# Patient Record
Sex: FEMALE | Race: WHITE | NOT HISPANIC OR LATINO
[De-identification: names, ages, dates, MRNs, and addresses within clinical notes are randomized per-mention and may not be internally consistent; named-entity substitution may affect disease eponyms.]

---

## 2017-03-20 ENCOUNTER — APPOINTMENT (OUTPATIENT)
Dept: THORACIC SURGERY | Facility: CLINIC | Age: 82
End: 2017-03-20

## 2017-03-20 VITALS
DIASTOLIC BLOOD PRESSURE: 72 MMHG | HEART RATE: 79 BPM | RESPIRATION RATE: 16 BRPM | HEIGHT: 61 IN | SYSTOLIC BLOOD PRESSURE: 180 MMHG | BODY MASS INDEX: 24.55 KG/M2 | WEIGHT: 130 LBS | OXYGEN SATURATION: 95 %

## 2017-04-07 ENCOUNTER — APPOINTMENT (OUTPATIENT)
Dept: MRI IMAGING | Facility: CLINIC | Age: 82
End: 2017-04-07

## 2017-04-07 ENCOUNTER — OUTPATIENT (OUTPATIENT)
Dept: OUTPATIENT SERVICES | Facility: HOSPITAL | Age: 82
LOS: 1 days | End: 2017-04-07
Payer: COMMERCIAL

## 2017-04-07 DIAGNOSIS — R59.0 LOCALIZED ENLARGED LYMPH NODES: ICD-10-CM

## 2017-04-07 PROCEDURE — A9585: CPT

## 2017-04-07 PROCEDURE — 71552 MRI CHEST W/O & W/DYE: CPT

## 2017-05-08 ENCOUNTER — APPOINTMENT (OUTPATIENT)
Dept: THORACIC SURGERY | Facility: CLINIC | Age: 82
End: 2017-05-08

## 2017-05-08 VITALS
DIASTOLIC BLOOD PRESSURE: 70 MMHG | RESPIRATION RATE: 16 BRPM | WEIGHT: 130 LBS | HEIGHT: 60 IN | BODY MASS INDEX: 25.52 KG/M2 | HEART RATE: 98 BPM | OXYGEN SATURATION: 95 % | SYSTOLIC BLOOD PRESSURE: 148 MMHG

## 2017-11-02 ENCOUNTER — RECORD ABSTRACTING (OUTPATIENT)
Age: 82
End: 2017-11-02

## 2017-12-18 ENCOUNTER — APPOINTMENT (OUTPATIENT)
Dept: THORACIC SURGERY | Facility: CLINIC | Age: 82
End: 2017-12-18
Payer: MEDICARE

## 2017-12-18 VITALS
DIASTOLIC BLOOD PRESSURE: 89 MMHG | SYSTOLIC BLOOD PRESSURE: 167 MMHG | WEIGHT: 111 LBS | RESPIRATION RATE: 16 BRPM | HEART RATE: 96 BPM | OXYGEN SATURATION: 93 % | BODY MASS INDEX: 21.79 KG/M2 | HEIGHT: 60 IN

## 2017-12-18 PROCEDURE — 99215 OFFICE O/P EST HI 40 MIN: CPT

## 2018-01-03 ENCOUNTER — APPOINTMENT (OUTPATIENT)
Dept: PULMONOLOGY | Facility: CLINIC | Age: 83
End: 2018-01-03
Payer: COMMERCIAL

## 2018-01-03 VITALS
BODY MASS INDEX: 23.51 KG/M2 | HEIGHT: 58 IN | DIASTOLIC BLOOD PRESSURE: 80 MMHG | HEART RATE: 79 BPM | SYSTOLIC BLOOD PRESSURE: 138 MMHG | WEIGHT: 112 LBS | OXYGEN SATURATION: 95 %

## 2018-01-03 DIAGNOSIS — Z87.891 PERSONAL HISTORY OF NICOTINE DEPENDENCE: ICD-10-CM

## 2018-01-03 PROCEDURE — 94010 BREATHING CAPACITY TEST: CPT

## 2018-01-03 PROCEDURE — 99215 OFFICE O/P EST HI 40 MIN: CPT | Mod: 25

## 2018-02-05 ENCOUNTER — APPOINTMENT (OUTPATIENT)
Dept: THORACIC SURGERY | Facility: CLINIC | Age: 83
End: 2018-02-05
Payer: COMMERCIAL

## 2018-02-05 VITALS
RESPIRATION RATE: 16 BRPM | SYSTOLIC BLOOD PRESSURE: 123 MMHG | DIASTOLIC BLOOD PRESSURE: 73 MMHG | HEIGHT: 58 IN | OXYGEN SATURATION: 94 % | HEART RATE: 85 BPM

## 2018-02-05 PROCEDURE — 99214 OFFICE O/P EST MOD 30 MIN: CPT

## 2018-04-09 ENCOUNTER — APPOINTMENT (OUTPATIENT)
Dept: PULMONOLOGY | Facility: CLINIC | Age: 83
End: 2018-04-09
Payer: MEDICARE

## 2018-04-09 VITALS
BODY MASS INDEX: 23.2 KG/M2 | SYSTOLIC BLOOD PRESSURE: 114 MMHG | DIASTOLIC BLOOD PRESSURE: 78 MMHG | HEART RATE: 92 BPM | OXYGEN SATURATION: 94 % | WEIGHT: 111 LBS | RESPIRATION RATE: 16 BRPM

## 2018-04-09 DIAGNOSIS — R06.09 OTHER FORMS OF DYSPNEA: ICD-10-CM

## 2018-04-09 DIAGNOSIS — J44.9 CHRONIC OBSTRUCTIVE PULMONARY DISEASE, UNSPECIFIED: ICD-10-CM

## 2018-04-09 PROCEDURE — 99214 OFFICE O/P EST MOD 30 MIN: CPT

## 2018-04-09 RX ORDER — NIFEDIPINE 30 MG/1
30 TABLET, EXTENDED RELEASE ORAL
Qty: 90 | Refills: 0 | Status: ACTIVE | COMMUNITY
Start: 2017-06-28

## 2018-04-09 RX ORDER — SIMVASTATIN 40 MG/1
40 TABLET, FILM COATED ORAL
Qty: 90 | Refills: 0 | Status: ACTIVE | COMMUNITY
Start: 2017-12-08

## 2018-06-24 ENCOUNTER — EMERGENCY (EMERGENCY)
Facility: HOSPITAL | Age: 83
LOS: 1 days | Discharge: HOME | End: 2018-06-24
Attending: EMERGENCY MEDICINE
Payer: COMMERCIAL

## 2018-06-24 VITALS
OXYGEN SATURATION: 95 % | DIASTOLIC BLOOD PRESSURE: 72 MMHG | RESPIRATION RATE: 18 BRPM | HEART RATE: 78 BPM | SYSTOLIC BLOOD PRESSURE: 142 MMHG

## 2018-06-24 VITALS
HEART RATE: 87 BPM | SYSTOLIC BLOOD PRESSURE: 137 MMHG | TEMPERATURE: 98 F | RESPIRATION RATE: 16 BRPM | HEIGHT: 57 IN | DIASTOLIC BLOOD PRESSURE: 77 MMHG | OXYGEN SATURATION: 99 % | WEIGHT: 111.99 LBS

## 2018-06-24 LAB
ALBUMIN SERPL ELPH-MCNC: 3.8 G/DL — SIGNIFICANT CHANGE UP (ref 3.3–5.2)
ALP SERPL-CCNC: 110 U/L — SIGNIFICANT CHANGE UP (ref 40–120)
ALT FLD-CCNC: 14 U/L — SIGNIFICANT CHANGE UP
ANION GAP SERPL CALC-SCNC: 17 MMOL/L — SIGNIFICANT CHANGE UP (ref 5–17)
APPEARANCE UR: CLEAR — SIGNIFICANT CHANGE UP
APTT BLD: 29.3 SEC — SIGNIFICANT CHANGE UP (ref 27.5–37.4)
AST SERPL-CCNC: 18 U/L — SIGNIFICANT CHANGE UP
BASOPHILS # BLD AUTO: 0 K/UL — SIGNIFICANT CHANGE UP (ref 0–0.2)
BASOPHILS NFR BLD AUTO: 0.2 % — SIGNIFICANT CHANGE UP (ref 0–2)
BILIRUB SERPL-MCNC: 0.2 MG/DL — LOW (ref 0.4–2)
BILIRUB UR-MCNC: NEGATIVE — SIGNIFICANT CHANGE UP
BUN SERPL-MCNC: 12 MG/DL — SIGNIFICANT CHANGE UP (ref 8–20)
CALCIUM SERPL-MCNC: 8.9 MG/DL — SIGNIFICANT CHANGE UP (ref 8.6–10.2)
CHLORIDE SERPL-SCNC: 98 MMOL/L — SIGNIFICANT CHANGE UP (ref 98–107)
CO2 SERPL-SCNC: 23 MMOL/L — SIGNIFICANT CHANGE UP (ref 22–29)
COLOR SPEC: YELLOW — SIGNIFICANT CHANGE UP
CREAT SERPL-MCNC: 0.44 MG/DL — LOW (ref 0.5–1.3)
DIFF PNL FLD: ABNORMAL
EOSINOPHIL # BLD AUTO: 1.1 K/UL — HIGH (ref 0–0.5)
EOSINOPHIL NFR BLD AUTO: 7.4 % — HIGH (ref 0–6)
EPI CELLS # UR: SIGNIFICANT CHANGE UP
GLUCOSE SERPL-MCNC: 87 MG/DL — SIGNIFICANT CHANGE UP (ref 70–115)
GLUCOSE UR QL: NEGATIVE MG/DL — SIGNIFICANT CHANGE UP
HCT VFR BLD CALC: 39.1 % — SIGNIFICANT CHANGE UP (ref 37–47)
HGB BLD-MCNC: 12.4 G/DL — SIGNIFICANT CHANGE UP (ref 12–16)
INR BLD: 1.12 RATIO — SIGNIFICANT CHANGE UP (ref 0.88–1.16)
KETONES UR-MCNC: ABNORMAL
LEUKOCYTE ESTERASE UR-ACNC: ABNORMAL
LYMPHOCYTES # BLD AUTO: 21.3 % — SIGNIFICANT CHANGE UP (ref 20–55)
LYMPHOCYTES # BLD AUTO: 3.2 K/UL — SIGNIFICANT CHANGE UP (ref 1–4.8)
MCHC RBC-ENTMCNC: 26.1 PG — LOW (ref 27–31)
MCHC RBC-ENTMCNC: 31.7 G/DL — LOW (ref 32–36)
MCV RBC AUTO: 82.3 FL — SIGNIFICANT CHANGE UP (ref 81–99)
MONOCYTES # BLD AUTO: 1.4 K/UL — HIGH (ref 0–0.8)
MONOCYTES NFR BLD AUTO: 9.4 % — SIGNIFICANT CHANGE UP (ref 3–10)
NEUTROPHILS # BLD AUTO: 9.2 K/UL — HIGH (ref 1.8–8)
NEUTROPHILS NFR BLD AUTO: 61.3 % — SIGNIFICANT CHANGE UP (ref 37–73)
NITRITE UR-MCNC: NEGATIVE — SIGNIFICANT CHANGE UP
PH UR: 6 — SIGNIFICANT CHANGE UP (ref 5–8)
PLATELET # BLD AUTO: 416 K/UL — HIGH (ref 150–400)
POTASSIUM SERPL-MCNC: 3.4 MMOL/L — LOW (ref 3.5–5.3)
POTASSIUM SERPL-SCNC: 3.4 MMOL/L — LOW (ref 3.5–5.3)
PROT SERPL-MCNC: 7.4 G/DL — SIGNIFICANT CHANGE UP (ref 6.6–8.7)
PROT UR-MCNC: 30 MG/DL
PROTHROM AB SERPL-ACNC: 12.3 SEC — SIGNIFICANT CHANGE UP (ref 9.8–12.7)
RBC # BLD: 4.75 M/UL — SIGNIFICANT CHANGE UP (ref 4.4–5.2)
RBC # FLD: 14.3 % — SIGNIFICANT CHANGE UP (ref 11–15.6)
SODIUM SERPL-SCNC: 138 MMOL/L — SIGNIFICANT CHANGE UP (ref 135–145)
SP GR SPEC: 1 — LOW (ref 1.01–1.02)
TROPONIN T SERPL-MCNC: <0.01 NG/ML — SIGNIFICANT CHANGE UP (ref 0–0.06)
UROBILINOGEN FLD QL: NEGATIVE MG/DL — SIGNIFICANT CHANGE UP
WBC # BLD: 15 K/UL — HIGH (ref 4.8–10.8)
WBC # FLD AUTO: 15 K/UL — HIGH (ref 4.8–10.8)
WBC UR QL: SIGNIFICANT CHANGE UP

## 2018-06-24 PROCEDURE — 85027 COMPLETE CBC AUTOMATED: CPT

## 2018-06-24 PROCEDURE — 87086 URINE CULTURE/COLONY COUNT: CPT

## 2018-06-24 PROCEDURE — 71275 CT ANGIOGRAPHY CHEST: CPT

## 2018-06-24 PROCEDURE — 85730 THROMBOPLASTIN TIME PARTIAL: CPT

## 2018-06-24 PROCEDURE — 71045 X-RAY EXAM CHEST 1 VIEW: CPT | Mod: 26

## 2018-06-24 PROCEDURE — 85610 PROTHROMBIN TIME: CPT

## 2018-06-24 PROCEDURE — 36415 COLL VENOUS BLD VENIPUNCTURE: CPT

## 2018-06-24 PROCEDURE — 71045 X-RAY EXAM CHEST 1 VIEW: CPT

## 2018-06-24 PROCEDURE — 94640 AIRWAY INHALATION TREATMENT: CPT

## 2018-06-24 PROCEDURE — 99284 EMERGENCY DEPT VISIT MOD MDM: CPT | Mod: 25

## 2018-06-24 PROCEDURE — 71275 CT ANGIOGRAPHY CHEST: CPT | Mod: 26

## 2018-06-24 PROCEDURE — 81001 URINALYSIS AUTO W/SCOPE: CPT

## 2018-06-24 PROCEDURE — 96374 THER/PROPH/DIAG INJ IV PUSH: CPT | Mod: XU

## 2018-06-24 PROCEDURE — 80053 COMPREHEN METABOLIC PANEL: CPT

## 2018-06-24 PROCEDURE — 84484 ASSAY OF TROPONIN QUANT: CPT

## 2018-06-24 PROCEDURE — 99285 EMERGENCY DEPT VISIT HI MDM: CPT

## 2018-06-24 RX ORDER — ACETAMINOPHEN 500 MG
650 TABLET ORAL ONCE
Qty: 0 | Refills: 0 | Status: COMPLETED | OUTPATIENT
Start: 2018-06-24 | End: 2018-06-24

## 2018-06-24 RX ORDER — IPRATROPIUM/ALBUTEROL SULFATE 18-103MCG
3 AEROSOL WITH ADAPTER (GRAM) INHALATION ONCE
Qty: 0 | Refills: 0 | Status: COMPLETED | OUTPATIENT
Start: 2018-06-24 | End: 2018-06-24

## 2018-06-24 RX ORDER — SODIUM CHLORIDE 9 MG/ML
3 INJECTION INTRAMUSCULAR; INTRAVENOUS; SUBCUTANEOUS ONCE
Qty: 0 | Refills: 0 | Status: COMPLETED | OUTPATIENT
Start: 2018-06-24 | End: 2018-06-24

## 2018-06-24 RX ADMIN — Medication 650 MILLIGRAM(S): at 16:02

## 2018-06-24 RX ADMIN — Medication 125 MILLIGRAM(S): at 15:50

## 2018-06-24 RX ADMIN — Medication 3 MILLILITER(S): at 15:50

## 2018-06-24 RX ADMIN — SODIUM CHLORIDE 3 MILLILITER(S): 9 INJECTION INTRAMUSCULAR; INTRAVENOUS; SUBCUTANEOUS at 15:48

## 2018-06-24 RX ADMIN — Medication 650 MILLIGRAM(S): at 16:56

## 2018-06-24 NOTE — ED PROVIDER NOTE - OBJECTIVE STATEMENT
84 y/o F Pt with a hx of lung CA, COPD, HTN, resection of rt lung presents with several weeks of left side thoracic pain progressively worsening. Pt has bruising to the area but denies any trauma. She experiences SOB generally on ambulating. Denies fevers, chills. No further complaints at this time.

## 2018-06-24 NOTE — ED PROVIDER NOTE - MEDICAL DECISION MAKING DETAILS
Pt with a hx of lung CA, and hx of dx. Will perform CTA of chest, eval for lung pathology, pe, treat     for possible reactive airway disease, and re-eval.

## 2018-06-24 NOTE — ED ADULT NURSE NOTE - OBJECTIVE STATEMENT
recd pt  A/Ox3, pt c/o  bilateral flank pain, lower back pain, and left sided rib pain, hurts to touch, pt denies injury or falls, pt denies chest pain or sob. Respirations are even and unlabored, lungs cta, +bowel x4 quads, abdomen soft, nontender/nondistended, no guarding, rebound or rigidity noted, skin w/d/i.

## 2018-06-24 NOTE — ED PROVIDER NOTE - PROGRESS NOTE DETAILS
ct surgery np called - Martha - discussed pts c/o and CT reports given that patient reports she was suppose to be seen by by dr friedman but hasn't for unclear reason - elderly female relatively poor historian asked that np help to ensure f/u appt in the office for pt so son can be made aware, martha says she will come down and see patient.  pt resting comfortably lungs clear no distress

## 2018-06-24 NOTE — ED PROVIDER NOTE - CONSTITUTIONAL, MLM
normal... Well appearing, awake, alert, oriented to person, place, time/situation and in no apparent distress, frail

## 2018-06-25 LAB
CULTURE RESULTS: NO GROWTH — SIGNIFICANT CHANGE UP
SPECIMEN SOURCE: SIGNIFICANT CHANGE UP

## 2018-07-02 ENCOUNTER — APPOINTMENT (OUTPATIENT)
Dept: THORACIC SURGERY | Facility: CLINIC | Age: 83
End: 2018-07-02
Payer: MEDICARE

## 2018-07-02 VITALS
WEIGHT: 108 LBS | SYSTOLIC BLOOD PRESSURE: 116 MMHG | HEIGHT: 58 IN | BODY MASS INDEX: 22.67 KG/M2 | OXYGEN SATURATION: 93 % | DIASTOLIC BLOOD PRESSURE: 68 MMHG | HEART RATE: 93 BPM | RESPIRATION RATE: 16 BRPM

## 2018-07-02 PROCEDURE — 99214 OFFICE O/P EST MOD 30 MIN: CPT

## 2018-07-06 ENCOUNTER — OUTPATIENT (OUTPATIENT)
Dept: OUTPATIENT SERVICES | Facility: HOSPITAL | Age: 83
LOS: 1 days | Discharge: HOME | End: 2018-07-06
Payer: MEDICARE

## 2018-07-06 ENCOUNTER — APPOINTMENT (OUTPATIENT)
Dept: RADIATION ONCOLOGY | Facility: CLINIC | Age: 83
End: 2018-07-06
Payer: MEDICARE

## 2018-07-06 VITALS
DIASTOLIC BLOOD PRESSURE: 81 MMHG | OXYGEN SATURATION: 93 % | SYSTOLIC BLOOD PRESSURE: 145 MMHG | HEART RATE: 98 BPM | RESPIRATION RATE: 18 BRPM

## 2018-07-06 DIAGNOSIS — C34.31 MALIGNANT NEOPLASM OF LOWER LOBE, RIGHT BRONCHUS OR LUNG: ICD-10-CM

## 2018-07-06 DIAGNOSIS — Z80.1 FAMILY HISTORY OF MALIGNANT NEOPLASM OF TRACHEA, BRONCHUS AND LUNG: ICD-10-CM

## 2018-07-06 DIAGNOSIS — R91.1 SOLITARY PULMONARY NODULE: ICD-10-CM

## 2018-07-06 PROCEDURE — 99205 OFFICE O/P NEW HI 60 MIN: CPT | Mod: 25

## 2018-07-06 PROCEDURE — 77263 THER RADIOLOGY TX PLNG CPLX: CPT

## 2018-07-11 ENCOUNTER — OUTPATIENT (OUTPATIENT)
Dept: OUTPATIENT SERVICES | Facility: HOSPITAL | Age: 83
LOS: 1 days | End: 2018-07-11
Payer: COMMERCIAL

## 2018-07-11 VITALS
HEIGHT: 57 IN | RESPIRATION RATE: 16 BRPM | SYSTOLIC BLOOD PRESSURE: 144 MMHG | WEIGHT: 106.92 LBS | TEMPERATURE: 97 F | DIASTOLIC BLOOD PRESSURE: 81 MMHG | HEART RATE: 105 BPM

## 2018-07-11 DIAGNOSIS — I10 ESSENTIAL (PRIMARY) HYPERTENSION: ICD-10-CM

## 2018-07-11 DIAGNOSIS — Z90.2 ACQUIRED ABSENCE OF LUNG [PART OF]: Chronic | ICD-10-CM

## 2018-07-11 DIAGNOSIS — K60.3 ANAL FISTULA: Chronic | ICD-10-CM

## 2018-07-11 DIAGNOSIS — R22.2 LOCALIZED SWELLING, MASS AND LUMP, TRUNK: ICD-10-CM

## 2018-07-11 DIAGNOSIS — Z01.818 ENCOUNTER FOR OTHER PREPROCEDURAL EXAMINATION: ICD-10-CM

## 2018-07-11 DIAGNOSIS — Z98.49 CATARACT EXTRACTION STATUS, UNSPECIFIED EYE: Chronic | ICD-10-CM

## 2018-07-11 DIAGNOSIS — Z29.9 ENCOUNTER FOR PROPHYLACTIC MEASURES, UNSPECIFIED: ICD-10-CM

## 2018-07-11 LAB
APTT BLD: 32.7 SEC — SIGNIFICANT CHANGE UP (ref 27.5–37.4)
HCT VFR BLD CALC: 37.3 % — SIGNIFICANT CHANGE UP (ref 37–47)
HGB BLD-MCNC: 11.5 G/DL — LOW (ref 12–16)
INR BLD: 1.06 RATIO — SIGNIFICANT CHANGE UP (ref 0.88–1.16)
MCHC RBC-ENTMCNC: 25.4 PG — LOW (ref 27–31)
MCHC RBC-ENTMCNC: 30.8 G/DL — LOW (ref 32–36)
MCV RBC AUTO: 82.5 FL — SIGNIFICANT CHANGE UP (ref 81–99)
PLATELET # BLD AUTO: 405 K/UL — HIGH (ref 150–400)
PROTHROM AB SERPL-ACNC: 11.7 SEC — SIGNIFICANT CHANGE UP (ref 9.8–12.7)
RBC # BLD: 4.52 M/UL — SIGNIFICANT CHANGE UP (ref 4.4–5.2)
RBC # FLD: 14.9 % — SIGNIFICANT CHANGE UP (ref 11–15.6)
WBC # BLD: 13.5 K/UL — HIGH (ref 4.8–10.8)
WBC # FLD AUTO: 13.5 K/UL — HIGH (ref 4.8–10.8)

## 2018-07-11 PROCEDURE — 85027 COMPLETE CBC AUTOMATED: CPT

## 2018-07-11 PROCEDURE — 85730 THROMBOPLASTIN TIME PARTIAL: CPT

## 2018-07-11 PROCEDURE — 85610 PROTHROMBIN TIME: CPT

## 2018-07-11 PROCEDURE — 36415 COLL VENOUS BLD VENIPUNCTURE: CPT

## 2018-07-11 PROCEDURE — G0463: CPT

## 2018-07-11 RX ORDER — ALPRAZOLAM 0.25 MG
0 TABLET ORAL
Qty: 0 | Refills: 0 | COMMUNITY

## 2018-07-11 RX ORDER — SIMVASTATIN 20 MG/1
0 TABLET, FILM COATED ORAL
Qty: 0 | Refills: 0 | COMMUNITY

## 2018-07-11 NOTE — H&P PST ADULT - MALLAMPATI CLASS
Class IV (difficult) - the soft palate is not visible at all Class IV (difficult) - the soft palate is not visible at all/Attended by Dr. Vela Mallampati III

## 2018-07-11 NOTE — H&P PST ADULT - NSANTHOSAYNRD_GEN_A_CORE
No. VIRGIL screening performed.  STOP BANG Legend: 0-2 = LOW Risk; 3-4 = INTERMEDIATE Risk; 5-8 = HIGH Risk

## 2018-07-11 NOTE — H&P PST ADULT - NSANTHBPHIGHRD_ENT_A_CORE
From: Torin Fallon  To: Dot Moyer MD  Sent: 10/5/2017 3:36 PM CDT  Subject: perscription    [employer] will be sending a re-fill authorization for my metformin. Will be out in a few days. Also need to set a date for my next appointment, at which time I can also get my flu shot?     Hope you are having a good day,  Reanna
Noted. MEM.
Per spoke with pt. she has appointment on 11/28/17 11:40 am nfst lab.
Yes

## 2018-07-11 NOTE — H&P PST ADULT - PMH
Cancer  lung  Cataract    COPD (chronic obstructive pulmonary disease)    Hypertension    Risk factors for obstructive sleep apnea

## 2018-07-11 NOTE — H&P PST ADULT - HISTORY OF PRESENT ILLNESS
This is an 85 y.o female who presents to PST today.  The pt reports she has a history of lung cancer (right side) and has a yearly cat scan performed.  The most recent CT demonstrated an abnormal finding on the left lung.  She has noticed she is more short of breath and has been experiencing back pain to her left side

## 2018-07-11 NOTE — H&P PST ADULT - ASSESSMENT
CAPRINI SCORE [CLOT]    AGE RELATED RISK FACTORS                                                       MOBILITY RELATED FACTORS  [ ] Age 41-60 years                                            (1 Point)                  [ ] Bed rest                                                        (1 Point)  [] Age: 61-74 years                                           (2 Points)                 [ ] Plaster cast                                                   (2 Points)  [x ] Age= 75 years                                              (3 Points)                 [ ] Bed bound for more than 72 hours                 (2 Points)    DISEASE RELATED RISK FACTORS                                               GENDER SPECIFIC FACTORS  [ ] Edema in the lower extremities                       (1 Point)                  [ ] Pregnancy                                                     (1 Point)  [ ] Varicose veins                                               (1 Point)                  [ ] Post-partum < 6 weeks                                   (1 Point)             [ ] BMI > 25 Kg/m2                                            (1 Point)                  [ ] Hormonal therapy  or oral contraception          (1 Point)                 [ ] Sepsis (in the previous month)                        (1 Point)                  [ ] History of pregnancy complications                 (1 point)  [ ] Pneumonia or serious lung disease                                               [ ] Unexplained or recurrent                     (1 Point)           (in the previous month)                               (1 Point)  [ ] Abnormal pulmonary function test                     (1 Point)                 SURGERY RELATED RISK FACTORS  [ ] Acute myocardial infarction                              (1 Point)                 [ ]  Section                                             (1 Point)  [ ] Congestive heart failure (in the previous month)  (1 Point)               [x ] Minor surgery                                                  (1 Point)   [ ] Inflammatory bowel disease                             (1 Point)                 [ ] Arthroscopic surgery                                        (2 Points)  [ ] Central venous access                                      (2 Points)                [ ] General surgery lasting more than 45 minutes   (2 Points)       [ ] Stroke (in the previous month)                          (5 Points)               [ ] Elective arthroplasty                                         (5 Points)                                                                                                                                               HEMATOLOGY RELATED FACTORS                                                 TRAUMA RELATED RISK FACTORS  [ ] Prior episodes of VTE                                     (3 Points)                 [ ] Fracture of the hip, pelvis, or leg                       (5 Points)  [ ] Positive family history for VTE                         (3 Points)                 [ ] Acute spinal cord injury (in the previous month)  (5 Points)  [ ] Prothrombin 21898 A                                     (3 Points)                 [ ] Paralysis  (less than 1 month)                             (5 Points)  [ ] Factor V Leiden                                             (3 Points)                  [ ] Multiple Trauma within 1 month                        (5 Points)  [ ] Lupus anticoagulants                                     (3 Points)                                                           [ ] Anticardiolipin antibodies                               (3 Points)                                                       [ ] High homocysteine in the blood                      (3 Points)                                             [ ] Other congenital or acquired thrombophilia      (3 Points)                                                [ ] Heparin induced thrombocytopenia                  (3 Points)                                          Total Score [    4      ]

## 2018-07-16 ENCOUNTER — OUTPATIENT (OUTPATIENT)
Dept: OUTPATIENT SERVICES | Facility: HOSPITAL | Age: 83
LOS: 1 days | End: 2018-07-16
Payer: COMMERCIAL

## 2018-07-16 ENCOUNTER — RESULT REVIEW (OUTPATIENT)
Age: 83
End: 2018-07-16

## 2018-07-16 VITALS
DIASTOLIC BLOOD PRESSURE: 83 MMHG | OXYGEN SATURATION: 97 % | TEMPERATURE: 99 F | HEART RATE: 95 BPM | RESPIRATION RATE: 18 BRPM | HEIGHT: 57 IN | WEIGHT: 106.92 LBS | SYSTOLIC BLOOD PRESSURE: 116 MMHG

## 2018-07-16 VITALS — SYSTOLIC BLOOD PRESSURE: 135 MMHG | DIASTOLIC BLOOD PRESSURE: 81 MMHG

## 2018-07-16 DIAGNOSIS — R22.2 LOCALIZED SWELLING, MASS AND LUMP, TRUNK: ICD-10-CM

## 2018-07-16 DIAGNOSIS — Z90.2 ACQUIRED ABSENCE OF LUNG [PART OF]: Chronic | ICD-10-CM

## 2018-07-16 DIAGNOSIS — Z98.49 CATARACT EXTRACTION STATUS, UNSPECIFIED EYE: Chronic | ICD-10-CM

## 2018-07-16 DIAGNOSIS — K60.3 ANAL FISTULA: Chronic | ICD-10-CM

## 2018-07-16 PROCEDURE — 88341 IMHCHEM/IMCYTCHM EA ADD ANTB: CPT

## 2018-07-16 PROCEDURE — 88342 IMHCHEM/IMCYTCHM 1ST ANTB: CPT

## 2018-07-16 PROCEDURE — 32405: CPT | Mod: LT

## 2018-07-16 PROCEDURE — 88305 TISSUE EXAM BY PATHOLOGIST: CPT

## 2018-07-16 PROCEDURE — 88342 IMHCHEM/IMCYTCHM 1ST ANTB: CPT | Mod: 26

## 2018-07-16 PROCEDURE — 77012 CT SCAN FOR NEEDLE BIOPSY: CPT

## 2018-07-16 PROCEDURE — 71045 X-RAY EXAM CHEST 1 VIEW: CPT

## 2018-07-16 PROCEDURE — 77012 CT SCAN FOR NEEDLE BIOPSY: CPT | Mod: 26

## 2018-07-16 PROCEDURE — 88305 TISSUE EXAM BY PATHOLOGIST: CPT | Mod: 26

## 2018-07-16 PROCEDURE — 71045 X-RAY EXAM CHEST 1 VIEW: CPT | Mod: 26

## 2018-07-16 PROCEDURE — 88341 IMHCHEM/IMCYTCHM EA ADD ANTB: CPT | Mod: 26

## 2018-07-16 RX ORDER — HYDROMORPHONE HYDROCHLORIDE 2 MG/ML
0.5 INJECTION INTRAMUSCULAR; INTRAVENOUS; SUBCUTANEOUS ONCE
Qty: 0 | Refills: 0 | Status: DISCONTINUED | OUTPATIENT
Start: 2018-07-16 | End: 2018-07-16

## 2018-07-16 RX ORDER — ACETAMINOPHEN 500 MG
1000 TABLET ORAL ONCE
Qty: 0 | Refills: 0 | Status: DISCONTINUED | OUTPATIENT
Start: 2018-07-16 | End: 2018-07-16

## 2018-07-16 RX ADMIN — HYDROMORPHONE HYDROCHLORIDE 0.5 MILLIGRAM(S): 2 INJECTION INTRAMUSCULAR; INTRAVENOUS; SUBCUTANEOUS at 13:54

## 2018-07-16 NOTE — ASU DISCHARGE PLAN (ADULT/PEDIATRIC). - MEDICATION SUMMARY - MEDICATIONS TO TAKE
I will START or STAY ON the medications listed below when I get home from the hospital:    aspirin 81 mg oral tablet  -- 1 tab(s) by mouth once a day  -- Indication: For per PMD    PROzac 20 mg oral capsule  -- orally 2 times a day  -- Indication: For per PMD    Antivert 12.5 mg oral tablet  -- 1 tab(s) by mouth 3 times a day, As Needed  -- Indication: For per PMD    Zocor 40 mg oral tablet  -- 1 tab(s) by mouth once a day (at bedtime)  -- Indication: For per PMD    Xanax 0.25 mg oral tablet  -- 1 tab(s) by mouth 3 times a day  -- Indication: For per PMD    albuterol  -- Indication: For per PMD    Procardia  -- 30 milligram(s) by mouth once a day  -- Indication: For per PMD    PriLOSEC 20 mg oral delayed release capsule  -- 1 cap(s) by mouth once a day  -- Indication: For per PMD

## 2018-07-16 NOTE — ASU DISCHARGE PLAN (ADULT/PEDIATRIC). - ASU FOLLOWUP
911 or go to the nearest Emergency Room
Melyssa Escamilla MD  Pediatric Hospitalist  office: [phone]  pager: [phone]

## 2018-07-16 NOTE — ASU DISCHARGE PLAN (ADULT/PEDIATRIC). - NOTIFY
Fever greater than 101/Pain not relieved by Medications/chest pain or shortness of breath go to nearest emergency dept/Bleeding that does not stop

## 2018-07-23 PROBLEM — H26.9 UNSPECIFIED CATARACT: Chronic | Status: ACTIVE | Noted: 2018-07-11

## 2018-07-23 PROBLEM — Z91.89 OTHER SPECIFIED PERSONAL RISK FACTORS, NOT ELSEWHERE CLASSIFIED: Chronic | Status: ACTIVE | Noted: 2018-07-11

## 2018-07-26 PROCEDURE — 77290 THER RAD SIMULAJ FIELD CPLX: CPT | Mod: 26

## 2018-07-26 PROCEDURE — 77334 RADIATION TREATMENT AID(S): CPT | Mod: 26

## 2018-07-30 ENCOUNTER — APPOINTMENT (OUTPATIENT)
Dept: THORACIC SURGERY | Facility: CLINIC | Age: 83
End: 2018-07-30
Payer: MEDICARE

## 2018-07-30 VITALS
HEART RATE: 100 BPM | OXYGEN SATURATION: 92 % | RESPIRATION RATE: 16 BRPM | WEIGHT: 106 LBS | BODY MASS INDEX: 22.25 KG/M2 | DIASTOLIC BLOOD PRESSURE: 73 MMHG | SYSTOLIC BLOOD PRESSURE: 119 MMHG | HEIGHT: 58 IN

## 2018-07-30 PROCEDURE — 99214 OFFICE O/P EST MOD 30 MIN: CPT

## 2018-08-01 PROCEDURE — 77334 RADIATION TREATMENT AID(S): CPT | Mod: 26

## 2018-08-01 PROCEDURE — 77293 RESPIRATOR MOTION MGMT SIMUL: CPT | Mod: 26

## 2018-08-01 PROCEDURE — 77295 3-D RADIOTHERAPY PLAN: CPT | Mod: 26

## 2018-08-01 PROCEDURE — 77300 RADIATION THERAPY DOSE PLAN: CPT | Mod: 26

## 2018-08-02 ENCOUNTER — RX RENEWAL (OUTPATIENT)
Age: 83
End: 2018-08-02

## 2018-08-06 ENCOUNTER — APPOINTMENT (OUTPATIENT)
Dept: PULMONOLOGY | Facility: CLINIC | Age: 83
End: 2018-08-06

## 2018-08-06 ENCOUNTER — OUTPATIENT (OUTPATIENT)
Dept: OUTPATIENT SERVICES | Facility: HOSPITAL | Age: 83
LOS: 1 days | Discharge: HOME | End: 2018-08-06

## 2018-08-06 DIAGNOSIS — Z98.49 CATARACT EXTRACTION STATUS, UNSPECIFIED EYE: Chronic | ICD-10-CM

## 2018-08-06 DIAGNOSIS — K60.3 ANAL FISTULA: Chronic | ICD-10-CM

## 2018-08-06 DIAGNOSIS — Z90.2 ACQUIRED ABSENCE OF LUNG [PART OF]: Chronic | ICD-10-CM

## 2018-08-06 DIAGNOSIS — C34.90 MALIGNANT NEOPLASM OF UNSPECIFIED PART OF UNSPECIFIED BRONCHUS OR LUNG: ICD-10-CM

## 2018-08-07 PROCEDURE — 77280 THER RAD SIMULAJ FIELD SMPL: CPT | Mod: 26

## 2018-08-09 PROCEDURE — 77280 THER RAD SIMULAJ FIELD SMPL: CPT | Mod: 26

## 2018-08-10 RX ORDER — OXYCODONE AND ACETAMINOPHEN 7.5; 325 MG/1; MG/1
7.5-325 TABLET ORAL
Qty: 90 | Refills: 0 | Status: ACTIVE | COMMUNITY
Start: 2018-08-10 | End: 1900-01-01

## 2018-08-14 ENCOUNTER — APPOINTMENT (OUTPATIENT)
Dept: PHYSICAL MEDICINE AND REHAB | Facility: CLINIC | Age: 83
End: 2018-08-14

## 2018-08-15 PROCEDURE — 77427 RADIATION TX MANAGEMENT X5: CPT

## 2018-08-16 VITALS
HEIGHT: 58 IN | BODY MASS INDEX: 20.57 KG/M2 | HEART RATE: 116 BPM | WEIGHT: 98 LBS | OXYGEN SATURATION: 86 % | DIASTOLIC BLOOD PRESSURE: 65 MMHG | SYSTOLIC BLOOD PRESSURE: 117 MMHG | RESPIRATION RATE: 16 BRPM

## 2018-08-16 RX ORDER — DEXAMETHASONE 2 MG/1
2 TABLET ORAL DAILY
Qty: 30 | Refills: 1 | Status: ACTIVE | COMMUNITY
Start: 2018-08-16 | End: 1900-01-01

## 2018-08-17 ENCOUNTER — APPOINTMENT (OUTPATIENT)
Dept: HEMATOLOGY ONCOLOGY | Facility: CLINIC | Age: 83
End: 2018-08-17
Payer: MEDICARE

## 2018-08-17 VITALS
WEIGHT: 98 LBS | OXYGEN SATURATION: 96 % | DIASTOLIC BLOOD PRESSURE: 63 MMHG | HEART RATE: 92 BPM | BODY MASS INDEX: 20.57 KG/M2 | SYSTOLIC BLOOD PRESSURE: 101 MMHG | HEIGHT: 58 IN

## 2018-08-17 DIAGNOSIS — C34.90 MALIGNANT NEOPLASM OF UNSPECIFIED PART OF UNSPECIFIED BRONCHUS OR LUNG: ICD-10-CM

## 2018-08-17 DIAGNOSIS — G89.3 NEOPLASM RELATED PAIN (ACUTE) (CHRONIC): ICD-10-CM

## 2018-08-17 DIAGNOSIS — R59.0 LOCALIZED ENLARGED LYMPH NODES: ICD-10-CM

## 2018-08-17 PROCEDURE — 99205 OFFICE O/P NEW HI 60 MIN: CPT

## 2018-08-17 RX ORDER — OXYCODONE AND ACETAMINOPHEN 5; 325 MG/1; MG/1
5-325 TABLET ORAL
Qty: 40 | Refills: 0 | Status: DISCONTINUED | COMMUNITY
Start: 2018-07-26 | End: 2018-08-17

## 2018-08-17 RX ORDER — MECLIZINE HYDROCHLORIDE 25 MG/1
25 TABLET ORAL
Refills: 0 | Status: ACTIVE | COMMUNITY
Start: 2018-01-03

## 2018-08-17 RX ORDER — METHYLPREDNISOLONE 4 MG/1
4 TABLET ORAL
Qty: 1 | Refills: 1 | Status: DISCONTINUED | COMMUNITY
Start: 2018-08-10 | End: 2018-08-17

## 2018-08-17 RX ORDER — FENTANYL 12 UG/H
12 PATCH, EXTENDED RELEASE TRANSDERMAL
Qty: 5 | Refills: 0 | Status: ACTIVE | COMMUNITY
Start: 2018-08-17 | End: 1900-01-01

## 2018-08-17 RX ORDER — OXYCODONE AND ACETAMINOPHEN 5; 325 MG/1; MG/1
5-325 TABLET ORAL
Qty: 20 | Refills: 0 | Status: DISCONTINUED | COMMUNITY
Start: 2018-07-12 | End: 2018-08-17

## 2018-08-17 RX ORDER — MECLIZINE HYDROCHLORIDE 12.5 MG/1
12.5 TABLET ORAL
Qty: 30 | Refills: 0 | Status: DISCONTINUED | COMMUNITY
Start: 2017-12-27 | End: 2018-08-17

## 2018-08-22 ENCOUNTER — FORM ENCOUNTER (OUTPATIENT)
Age: 83
End: 2018-08-22

## 2018-08-22 VITALS
HEART RATE: 105 BPM | BODY MASS INDEX: 20.78 KG/M2 | DIASTOLIC BLOOD PRESSURE: 65 MMHG | HEIGHT: 58 IN | WEIGHT: 99 LBS | SYSTOLIC BLOOD PRESSURE: 145 MMHG | OXYGEN SATURATION: 97 % | RESPIRATION RATE: 16 BRPM | TEMPERATURE: 98.1 F

## 2018-08-22 PROBLEM — R59.0 MEDIASTINAL LYMPHADENOPATHY: Status: ACTIVE | Noted: 2018-08-22

## 2018-08-22 PROBLEM — C34.90 LUNG CANCER: Status: ACTIVE | Noted: 2018-08-22

## 2018-08-22 PROCEDURE — 77427 RADIATION TX MANAGEMENT X5: CPT

## 2018-08-23 ENCOUNTER — APPOINTMENT (OUTPATIENT)
Dept: NUCLEAR MEDICINE | Facility: CLINIC | Age: 83
End: 2018-08-23
Payer: MEDICARE

## 2018-08-23 ENCOUNTER — OUTPATIENT (OUTPATIENT)
Dept: OUTPATIENT SERVICES | Facility: HOSPITAL | Age: 83
LOS: 1 days | End: 2018-08-23

## 2018-08-23 DIAGNOSIS — K60.3 ANAL FISTULA: Chronic | ICD-10-CM

## 2018-08-23 DIAGNOSIS — Z98.49 CATARACT EXTRACTION STATUS, UNSPECIFIED EYE: Chronic | ICD-10-CM

## 2018-08-23 DIAGNOSIS — Z90.2 ACQUIRED ABSENCE OF LUNG [PART OF]: Chronic | ICD-10-CM

## 2018-08-23 DIAGNOSIS — G89.3 NEOPLASM RELATED PAIN (ACUTE) (CHRONIC): ICD-10-CM

## 2018-08-23 PROCEDURE — 78815 PET IMAGE W/CT SKULL-THIGH: CPT | Mod: 26,PS

## 2018-08-27 ENCOUNTER — INPATIENT (INPATIENT)
Facility: HOSPITAL | Age: 83
LOS: 6 days | Discharge: HOSPICE | DRG: 190 | End: 2018-09-03
Attending: INTERNAL MEDICINE | Admitting: GENERAL ACUTE CARE HOSPITAL
Payer: COMMERCIAL

## 2018-08-27 VITALS
OXYGEN SATURATION: 100 % | WEIGHT: 89.95 LBS | RESPIRATION RATE: 20 BRPM | SYSTOLIC BLOOD PRESSURE: 104 MMHG | HEART RATE: 99 BPM | DIASTOLIC BLOOD PRESSURE: 54 MMHG

## 2018-08-27 DIAGNOSIS — Z90.2 ACQUIRED ABSENCE OF LUNG [PART OF]: Chronic | ICD-10-CM

## 2018-08-27 DIAGNOSIS — Z98.49 CATARACT EXTRACTION STATUS, UNSPECIFIED EYE: Chronic | ICD-10-CM

## 2018-08-27 DIAGNOSIS — K60.3 ANAL FISTULA: Chronic | ICD-10-CM

## 2018-08-27 DIAGNOSIS — R06.02 SHORTNESS OF BREATH: ICD-10-CM

## 2018-08-27 LAB
ALBUMIN SERPL ELPH-MCNC: 3 G/DL — LOW (ref 3.3–5.2)
ALP SERPL-CCNC: 109 U/L — SIGNIFICANT CHANGE UP (ref 40–120)
ALT FLD-CCNC: 33 U/L — HIGH
ANION GAP SERPL CALC-SCNC: 12 MMOL/L — SIGNIFICANT CHANGE UP (ref 5–17)
ANISOCYTOSIS BLD QL: SLIGHT — SIGNIFICANT CHANGE UP
APTT BLD: 25.8 SEC — LOW (ref 27.5–37.4)
AST SERPL-CCNC: 17 U/L — SIGNIFICANT CHANGE UP
BASOPHILS # BLD AUTO: 0 K/UL — SIGNIFICANT CHANGE UP (ref 0–0.2)
BILIRUB SERPL-MCNC: 0.3 MG/DL — LOW (ref 0.4–2)
BUN SERPL-MCNC: 16 MG/DL — SIGNIFICANT CHANGE UP (ref 8–20)
CALCIUM SERPL-MCNC: 8.5 MG/DL — LOW (ref 8.6–10.2)
CHLORIDE SERPL-SCNC: 92 MMOL/L — LOW (ref 98–107)
CO2 SERPL-SCNC: 29 MMOL/L — SIGNIFICANT CHANGE UP (ref 22–29)
CREAT SERPL-MCNC: 0.36 MG/DL — LOW (ref 0.5–1.3)
EOSINOPHIL # BLD AUTO: 0.1 K/UL — SIGNIFICANT CHANGE UP (ref 0–0.5)
EOSINOPHIL NFR BLD AUTO: 1 % — SIGNIFICANT CHANGE UP (ref 0–5)
GLUCOSE SERPL-MCNC: 117 MG/DL — HIGH (ref 70–115)
HCT VFR BLD CALC: 34.5 % — LOW (ref 37–47)
HGB BLD-MCNC: 10.7 G/DL — LOW (ref 12–16)
INR BLD: 1.06 RATIO — SIGNIFICANT CHANGE UP (ref 0.88–1.16)
LYMPHOCYTES # BLD AUTO: 0.6 K/UL — LOW (ref 1–4.8)
LYMPHOCYTES # BLD AUTO: 3 % — LOW (ref 20–55)
MCHC RBC-ENTMCNC: 25.2 PG — LOW (ref 27–31)
MCHC RBC-ENTMCNC: 31 G/DL — LOW (ref 32–36)
MCV RBC AUTO: 81.4 FL — SIGNIFICANT CHANGE UP (ref 81–99)
MICROCYTES BLD QL: SLIGHT — SIGNIFICANT CHANGE UP
MONOCYTES # BLD AUTO: 0.9 K/UL — HIGH (ref 0–0.8)
MONOCYTES NFR BLD AUTO: 4 % — SIGNIFICANT CHANGE UP (ref 3–10)
NEUTROPHILS # BLD AUTO: 21.3 K/UL — HIGH (ref 1.8–8)
NEUTROPHILS NFR BLD AUTO: 88 % — HIGH (ref 37–73)
NEUTS BAND # BLD: 3 % — SIGNIFICANT CHANGE UP (ref 0–8)
NT-PROBNP SERPL-SCNC: 457 PG/ML — HIGH (ref 0–300)
PLAT MORPH BLD: NORMAL — SIGNIFICANT CHANGE UP
PLATELET # BLD AUTO: 254 K/UL — SIGNIFICANT CHANGE UP (ref 150–400)
POIKILOCYTOSIS BLD QL AUTO: SLIGHT — SIGNIFICANT CHANGE UP
POTASSIUM SERPL-MCNC: 4.5 MMOL/L — SIGNIFICANT CHANGE UP (ref 3.5–5.3)
POTASSIUM SERPL-SCNC: 4.5 MMOL/L — SIGNIFICANT CHANGE UP (ref 3.5–5.3)
PROT SERPL-MCNC: 5.9 G/DL — LOW (ref 6.6–8.7)
PROTHROM AB SERPL-ACNC: 11.7 SEC — SIGNIFICANT CHANGE UP (ref 9.8–12.7)
RBC # BLD: 4.24 M/UL — LOW (ref 4.4–5.2)
RBC # FLD: 16.6 % — HIGH (ref 11–15.6)
RBC BLD AUTO: ABNORMAL
SODIUM SERPL-SCNC: 133 MMOL/L — LOW (ref 135–145)
TROPONIN T SERPL-MCNC: <0.01 NG/ML — SIGNIFICANT CHANGE UP (ref 0–0.06)
VARIANT LYMPHS # BLD: 1 % — SIGNIFICANT CHANGE UP (ref 0–6)
WBC # BLD: 23.2 K/UL — HIGH (ref 4.8–10.8)
WBC # FLD AUTO: 23.2 K/UL — HIGH (ref 4.8–10.8)

## 2018-08-27 PROCEDURE — 93010 ELECTROCARDIOGRAM REPORT: CPT

## 2018-08-27 PROCEDURE — 99285 EMERGENCY DEPT VISIT HI MDM: CPT

## 2018-08-27 PROCEDURE — 71045 X-RAY EXAM CHEST 1 VIEW: CPT | Mod: 26

## 2018-08-27 PROCEDURE — 71275 CT ANGIOGRAPHY CHEST: CPT | Mod: 26

## 2018-08-27 PROCEDURE — 99223 1ST HOSP IP/OBS HIGH 75: CPT

## 2018-08-27 RX ORDER — NIFEDIPINE 30 MG
30 TABLET, EXTENDED RELEASE 24 HR ORAL
Qty: 0 | Refills: 0 | COMMUNITY

## 2018-08-27 RX ORDER — MORPHINE SULFATE 50 MG/1
2 CAPSULE, EXTENDED RELEASE ORAL ONCE
Qty: 0 | Refills: 0 | Status: DISCONTINUED | OUTPATIENT
Start: 2018-08-27 | End: 2018-08-27

## 2018-08-27 RX ORDER — ALBUTEROL 90 UG/1
2.5 AEROSOL, METERED ORAL
Qty: 0 | Refills: 0 | Status: DISCONTINUED | OUTPATIENT
Start: 2018-08-27 | End: 2018-09-03

## 2018-08-27 RX ORDER — OXYCODONE HYDROCHLORIDE 5 MG/1
5 TABLET ORAL EVERY 4 HOURS
Qty: 0 | Refills: 0 | Status: DISCONTINUED | OUTPATIENT
Start: 2018-08-27 | End: 2018-09-03

## 2018-08-27 RX ORDER — CEFEPIME 1 G/1
1000 INJECTION, POWDER, FOR SOLUTION INTRAMUSCULAR; INTRAVENOUS EVERY 12 HOURS
Qty: 0 | Refills: 0 | Status: DISCONTINUED | OUTPATIENT
Start: 2018-08-28 | End: 2018-09-03

## 2018-08-27 RX ORDER — OMEPRAZOLE 10 MG/1
1 CAPSULE, DELAYED RELEASE ORAL
Qty: 0 | Refills: 0 | COMMUNITY

## 2018-08-27 RX ORDER — NIFEDIPINE 30 MG
30 TABLET, EXTENDED RELEASE 24 HR ORAL DAILY
Qty: 0 | Refills: 0 | Status: DISCONTINUED | OUTPATIENT
Start: 2018-08-28 | End: 2018-09-03

## 2018-08-27 RX ORDER — VANCOMYCIN HCL 1 G
1000 VIAL (EA) INTRAVENOUS EVERY 12 HOURS
Qty: 0 | Refills: 0 | Status: DISCONTINUED | OUTPATIENT
Start: 2018-08-27 | End: 2018-08-27

## 2018-08-27 RX ORDER — OXYCODONE HYDROCHLORIDE 5 MG/1
5 TABLET ORAL ONCE
Qty: 0 | Refills: 0 | Status: DISCONTINUED | OUTPATIENT
Start: 2018-08-27 | End: 2018-08-27

## 2018-08-27 RX ORDER — IPRATROPIUM/ALBUTEROL SULFATE 18-103MCG
3 AEROSOL WITH ADAPTER (GRAM) INHALATION EVERY 4 HOURS
Qty: 0 | Refills: 0 | Status: DISCONTINUED | OUTPATIENT
Start: 2018-08-27 | End: 2018-09-03

## 2018-08-27 RX ORDER — PANTOPRAZOLE SODIUM 20 MG/1
40 TABLET, DELAYED RELEASE ORAL
Qty: 0 | Refills: 0 | Status: DISCONTINUED | OUTPATIENT
Start: 2018-08-27 | End: 2018-09-03

## 2018-08-27 RX ORDER — ALPRAZOLAM 0.25 MG
1 TABLET ORAL
Qty: 0 | Refills: 0 | COMMUNITY

## 2018-08-27 RX ORDER — ENOXAPARIN SODIUM 100 MG/ML
40 INJECTION SUBCUTANEOUS EVERY 24 HOURS
Qty: 0 | Refills: 0 | Status: DISCONTINUED | OUTPATIENT
Start: 2018-08-27 | End: 2018-09-03

## 2018-08-27 RX ORDER — MECLIZINE HCL 12.5 MG
1 TABLET ORAL
Qty: 0 | Refills: 0 | COMMUNITY

## 2018-08-27 RX ORDER — MORPHINE SULFATE 50 MG/1
2 CAPSULE, EXTENDED RELEASE ORAL EVERY 6 HOURS
Qty: 0 | Refills: 0 | Status: DISCONTINUED | OUTPATIENT
Start: 2018-08-27 | End: 2018-09-01

## 2018-08-27 RX ORDER — VANCOMYCIN HCL 1 G
500 VIAL (EA) INTRAVENOUS EVERY 12 HOURS
Qty: 0 | Refills: 0 | Status: DISCONTINUED | OUTPATIENT
Start: 2018-08-27 | End: 2018-08-30

## 2018-08-27 RX ORDER — ASPIRIN/CALCIUM CARB/MAGNESIUM 324 MG
81 TABLET ORAL DAILY
Qty: 0 | Refills: 0 | Status: DISCONTINUED | OUTPATIENT
Start: 2018-08-27 | End: 2018-09-03

## 2018-08-27 RX ORDER — SIMVASTATIN 20 MG/1
40 TABLET, FILM COATED ORAL AT BEDTIME
Qty: 0 | Refills: 0 | Status: DISCONTINUED | OUTPATIENT
Start: 2018-08-27 | End: 2018-09-03

## 2018-08-27 RX ORDER — FLUOXETINE HCL 10 MG
20 CAPSULE ORAL
Qty: 0 | Refills: 0 | Status: DISCONTINUED | OUTPATIENT
Start: 2018-08-27 | End: 2018-09-03

## 2018-08-27 RX ORDER — CEFEPIME 1 G/1
INJECTION, POWDER, FOR SOLUTION INTRAMUSCULAR; INTRAVENOUS
Qty: 0 | Refills: 0 | Status: DISCONTINUED | OUTPATIENT
Start: 2018-08-28 | End: 2018-09-03

## 2018-08-27 RX ORDER — HYDROMORPHONE HYDROCHLORIDE 2 MG/ML
1 INJECTION INTRAMUSCULAR; INTRAVENOUS; SUBCUTANEOUS ONCE
Qty: 0 | Refills: 0 | Status: DISCONTINUED | OUTPATIENT
Start: 2018-08-27 | End: 2018-08-27

## 2018-08-27 RX ORDER — FLUOXETINE HCL 10 MG
0 CAPSULE ORAL
Qty: 0 | Refills: 0 | COMMUNITY

## 2018-08-27 RX ORDER — IPRATROPIUM/ALBUTEROL SULFATE 18-103MCG
3 AEROSOL WITH ADAPTER (GRAM) INHALATION ONCE
Qty: 0 | Refills: 0 | Status: COMPLETED | OUTPATIENT
Start: 2018-08-27 | End: 2018-08-27

## 2018-08-27 RX ORDER — SODIUM CHLORIDE 9 MG/ML
1000 INJECTION INTRAMUSCULAR; INTRAVENOUS; SUBCUTANEOUS
Qty: 0 | Refills: 0 | Status: DISCONTINUED | OUTPATIENT
Start: 2018-08-27 | End: 2018-08-31

## 2018-08-27 RX ORDER — ALPRAZOLAM 0.25 MG
0.25 TABLET ORAL
Qty: 0 | Refills: 0 | Status: DISCONTINUED | OUTPATIENT
Start: 2018-08-27 | End: 2018-08-31

## 2018-08-27 RX ORDER — ALBUTEROL 90 UG/1
0 AEROSOL, METERED ORAL
Qty: 0 | Refills: 0 | COMMUNITY

## 2018-08-27 RX ORDER — CEFEPIME 1 G/1
1000 INJECTION, POWDER, FOR SOLUTION INTRAMUSCULAR; INTRAVENOUS ONCE
Qty: 0 | Refills: 0 | Status: COMPLETED | OUTPATIENT
Start: 2018-08-27 | End: 2018-08-28

## 2018-08-27 RX ORDER — FENTANYL CITRATE 50 UG/ML
1 INJECTION INTRAVENOUS
Qty: 0 | Refills: 0 | Status: DISCONTINUED | OUTPATIENT
Start: 2018-08-27 | End: 2018-09-02

## 2018-08-27 RX ADMIN — SODIUM CHLORIDE 75 MILLILITER(S): 9 INJECTION INTRAMUSCULAR; INTRAVENOUS; SUBCUTANEOUS at 21:16

## 2018-08-27 RX ADMIN — MORPHINE SULFATE 2 MILLIGRAM(S): 50 CAPSULE, EXTENDED RELEASE ORAL at 11:07

## 2018-08-27 RX ADMIN — MORPHINE SULFATE 2 MILLIGRAM(S): 50 CAPSULE, EXTENDED RELEASE ORAL at 10:52

## 2018-08-27 RX ADMIN — HYDROMORPHONE HYDROCHLORIDE 1 MILLIGRAM(S): 2 INJECTION INTRAMUSCULAR; INTRAVENOUS; SUBCUTANEOUS at 19:07

## 2018-08-27 RX ADMIN — Medication 125 MILLIGRAM(S): at 09:56

## 2018-08-27 RX ADMIN — Medication 3 MILLILITER(S): at 10:23

## 2018-08-27 RX ADMIN — FENTANYL CITRATE 1 PATCH: 50 INJECTION INTRAVENOUS at 21:16

## 2018-08-27 RX ADMIN — OXYCODONE HYDROCHLORIDE 5 MILLIGRAM(S): 5 TABLET ORAL at 19:39

## 2018-08-27 RX ADMIN — Medication 0.25 MILLIGRAM(S): at 21:16

## 2018-08-27 RX ADMIN — Medication 3 MILLILITER(S): at 19:49

## 2018-08-27 RX ADMIN — HYDROMORPHONE HYDROCHLORIDE 1 MILLIGRAM(S): 2 INJECTION INTRAMUSCULAR; INTRAVENOUS; SUBCUTANEOUS at 19:38

## 2018-08-27 RX ADMIN — OXYCODONE HYDROCHLORIDE 5 MILLIGRAM(S): 5 TABLET ORAL at 17:35

## 2018-08-27 NOTE — H&P ADULT - HISTORY OF PRESENT ILLNESS
Patient is a 85 year old female with PMH of COPD, Lung CA (s/p radiation therapy), Anxiety, and HTN who presented to the ED with worsening shortness of breath since last night. Patient is a poor historian, therefore most information obtained from chart review. Per EMS, patient was hypoxic at home and then placed on cpap support with some improvement. In the ED, patient was given 2 nebulizer treatments with improvement in chest tightness. CT chest negative for PE with progression of disease and destruction of left chest wall and evidence of liver mets. Patient initially in severe left sided chest wall pain and anxious. Pain significantly improved with IV dilaudid. Patient requesting to be DNR/DNI and interested in comfort measures only. Hospice and palliative care consults placed. Currently, patient denies chest pain, lightheadedness, dizziness, nausea/vomiting, abdominal pain, diarrhea, fevers or chills. Labs significant for leukocytosis but patient on chronic steroids at home. Patient does report an intermittent episodes of a productive cough.

## 2018-08-27 NOTE — ED ADULT TRIAGE NOTE - CHIEF COMPLAINT QUOTE
Patient is awake and oriented times 3, complains of sob arrives on cpap with SOB, respiratory called PTA

## 2018-08-27 NOTE — ED ADULT NURSE NOTE - NSIMPLEMENTINTERV_GEN_ALL_ED
Implemented All Fall with Harm Risk Interventions:  [facility] to call system. Call bell, personal items and telephone within reach. Instruct patient to call for assistance. Room bathroom lighting operational. Non-slip footwear when patient is off stretcher. Physically safe environment: no spills, clutter or unnecessary equipment. Stretcher in lowest position, wheels locked, appropriate side rails in place. Provide visual cue, wrist band, yellow gown, etc. Monitor gait and stability. Monitor for mental status changes and reorient to person, place, and time. Review medications for side effects contributing to fall risk. Reinforce activity limits and safety measures with patient and family. Provide visual clues: red socks.

## 2018-08-27 NOTE — ED ADULT NURSE REASSESSMENT NOTE - NS ED NURSE REASSESS COMMENT FT1
pt a&ox3 denies any pain/discomfort. o2 in place no s/s resp distress. resp even and unlabored. pt in good spirits. fentanyl patch placed to LCW. pending bed. updated on plan of care, call bell in reach. will monitor

## 2018-08-27 NOTE — CONSULT NOTE ADULT - SUBJECTIVE AND OBJECTIVE BOX
REASON FOR CONSULT: Shortness of breathing    SUBJECTIVE: (***)        OBJECTIVE  ROS:  (***)    PHYSICAL EXAM: Tele-evaluation precludes physical exam.       LABS AND IMAGING DATA:                        10.7   23.2  )-----------( 254      ( 27 Aug 2018 10:37 )             34.5     08-27    133<L>  |  92<L>  |  16.0  ----------------------------<  117<H>  4.5   |  29.0  |  0.36<L>    Ca    8.5<L>      27 Aug 2018 10:37    TPro  5.9<L>  /  Alb  3.0<L>  /  TBili  0.3<L>  /  DBili  x   /  AST  17  /  ALT  33<H>  /  AlkPhos  109  08-27          ASSESSMENT AND PLAN: (***)    Care plan discussed with (***) REASON FOR CONSULT: Shortness of breathing    SUBJECTIVE:   ED HPI: 86yo F hx of copd, lung ca (sp radiation therapy), anxiety, htn p.w sob since last night worsening this morning. as per ems sat 90% at home placed on cpap with some improvement given 2 nebs. pt states that felt similar to her copd exacerbaiton. on oral steroids at home. + chest tightness. Denies f/c/n/v//palpitations/ cough/rash/headache/dizziness/abd.pain/d/c/dysuria/hematuria. no sick contacts no recent travel. no hx of dvt/pe    Above ED HPI reviewed and noted: patient currently in moderate to severe distress due to pain, patient refused to be given more medication to optimize her pain control as she says her daughter is very worried about the pain medicaiton. patient request that this be discussed with her daughter before it is implemented. patient indicated that her pain has never become this strong and it has never caused her such respiratory distress. She reports being unable to speak or think properly due to her pain at this time. Interview with patient stopped short. Daughter Shital called. extensive case discussion conducted as daughter was not clear about her mothers pain medication regimen. Brooke expressed being overwhelmed with the rapid deterioration of her mothers condition and is trying to care for her mother independently. She does express that she would like her mother to be home as soon as medically possible. Shital was explained about Home Hospice and is in agreement with the consultation. I explained that i will be also consulting Palliative care - optimization of pain control.  and case management for further evaluation of benefits from an insurance standpoint. Pt was again interviewed and advised that her daughter is in agreement with medical management as needed to optimize her pain control. patient now agree to accept the medication and appears less anxious.     OBJECTIVE  ROS:  +left sided chest pain  +shortness of breath     PHYSICAL EXAM: Tele-evaluation precludes physical exam.   patient shaking in pain, very concerned about pain medication being offered as she is worried as her daughter expressed significant concerns at home about her taking too much pain medication    LABS AND IMAGING DATA:                        10.7   23.2  )-----------( 254      ( 27 Aug 2018 10:37 )             34.5     08-27    133<L>  |  92<L>  |  16.0  ----------------------------<  117<H>  4.5   |  29.0  |  0.36<L>    Ca    8.5<L>      27 Aug 2018 10:37    TPro  5.9<L>  /  Alb  3.0<L>  /  TBili  0.3<L>  /  DBili  x   /  AST  17  /  ALT  33<H>  /  AlkPhos  109  08-27        ASSESSMENT AND PLAN:   84 y/o female with met lung cancer, with uncontrolled pain leading to COPD exacerbation with possible underlaying PNA   Admit to medical unit  Vanco/cefepime IV abx  Nebulizers q4hrs and albuterol Q2hrs prn for SOB  Fentanyl patch q48hrs  Oxycodone 5mg Q4hrs prn for moderate pain  Morphine IVP for severe breakthrough pain  Senna and colace GI prophylaxis  Hospice, Palliative  and  consulted  regular diet with ensure supplemented, patient is cachectic  Home medication reconciliation cont.   GI prophylaxis  DVT prophylaxis      Care plan discussed with Dr. Choudhary

## 2018-08-27 NOTE — ED PROVIDER NOTE - OBJECTIVE STATEMENT
86yo F hx of copd, lung ca (sp radiation therapy), anxiety, htn p.w sob since last night worsening this morning. as per ems sat 90% at home placed on cpap with some improvement given 2 nebs. pt states that felt similar to her copd exacerbaiton. on oral steroids at home. + chest tightness. Denies f/c/n/v//palpitations/ cough/rash/headache/dizziness/abd.pain/d/c/dysuria/hematuria. no sick contacts no recent travel. no hx of dvt/pe

## 2018-08-27 NOTE — H&P ADULT - ASSESSMENT
Patient is a 85 year old female with PMH of COPD, Lung CA (s/p radiation therapy), Anxiety, and HTN who presented to the ED with worsening shortness of breath since last night due to COPD exacerbation and progression of underlying lung tumor.    1. Acute hypoxic respiratory failure   -admit to monitored bed with   -due to COPD exacerbation with progression of Pancoast Tumor  -supplemental O2 and bronchodilators  -IV steroids  -trial of antibiotics  -serum procal  -nocturnal and PRN bipap  -CT chest with progression of lung tumor with destruction of left sided chest wall    2. 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 Anxiety  -Xanax PRN    4. HTN  -resume home medications    5. Hypovolemic Hyponatremia; mild  -likely due to low solute intake and hypovolemia  -trial of judicious hydration   -repeat BMP in AM    6. Dysphagia  -pureed diet  -SLP consult    DVT prophylaxis - heparin SC    Patient is DNR/DNI with very poor prognosis. Patient would like to be kept comfortable. Palliative and Hospice consult requested.

## 2018-08-27 NOTE — ED ADULT NURSE NOTE - OBJECTIVE STATEMENT
pt presents via EMS with respiratory difficulty; pt presented with BIPAP in place; pt is awake, alert, verbal; pt with hx significant for COPD and lung ca; pt receiving radiation tx for lung ca.

## 2018-08-28 DIAGNOSIS — R07.89 OTHER CHEST PAIN: ICD-10-CM

## 2018-08-28 DIAGNOSIS — K59.03 DRUG INDUCED CONSTIPATION: ICD-10-CM

## 2018-08-28 DIAGNOSIS — F41.9 ANXIETY DISORDER, UNSPECIFIED: ICD-10-CM

## 2018-08-28 DIAGNOSIS — C34.92 MALIGNANT NEOPLASM OF UNSPECIFIED PART OF LEFT BRONCHUS OR LUNG: ICD-10-CM

## 2018-08-28 DIAGNOSIS — J18.9 PNEUMONIA, UNSPECIFIED ORGANISM: ICD-10-CM

## 2018-08-28 LAB
ANION GAP SERPL CALC-SCNC: 15 MMOL/L — SIGNIFICANT CHANGE UP (ref 5–17)
BASOPHILS # BLD AUTO: 0 K/UL — SIGNIFICANT CHANGE UP (ref 0–0.2)
BASOPHILS NFR BLD AUTO: 0.1 % — SIGNIFICANT CHANGE UP (ref 0–2)
BUN SERPL-MCNC: 19 MG/DL — SIGNIFICANT CHANGE UP (ref 8–20)
CALCIUM SERPL-MCNC: 8.6 MG/DL — SIGNIFICANT CHANGE UP (ref 8.6–10.2)
CHLORIDE SERPL-SCNC: 92 MMOL/L — LOW (ref 98–107)
CO2 SERPL-SCNC: 25 MMOL/L — SIGNIFICANT CHANGE UP (ref 22–29)
CREAT SERPL-MCNC: 0.34 MG/DL — LOW (ref 0.5–1.3)
EOSINOPHIL # BLD AUTO: 0 K/UL — SIGNIFICANT CHANGE UP (ref 0–0.5)
EOSINOPHIL NFR BLD AUTO: 0 % — SIGNIFICANT CHANGE UP (ref 0–6)
GLUCOSE SERPL-MCNC: 124 MG/DL — HIGH (ref 70–115)
HCT VFR BLD CALC: 35.2 % — LOW (ref 37–47)
HGB BLD-MCNC: 11 G/DL — LOW (ref 12–16)
LYMPHOCYTES # BLD AUTO: 0.5 K/UL — LOW (ref 1–4.8)
LYMPHOCYTES # BLD AUTO: 2 % — LOW (ref 20–55)
MAGNESIUM SERPL-MCNC: 2.1 MG/DL — SIGNIFICANT CHANGE UP (ref 1.6–2.6)
MCHC RBC-ENTMCNC: 25.1 PG — LOW (ref 27–31)
MCHC RBC-ENTMCNC: 31.3 G/DL — LOW (ref 32–36)
MCV RBC AUTO: 80.4 FL — LOW (ref 81–99)
MONOCYTES # BLD AUTO: 0.3 K/UL — SIGNIFICANT CHANGE UP (ref 0–0.8)
MONOCYTES NFR BLD AUTO: 1.2 % — LOW (ref 3–10)
NEUTROPHILS # BLD AUTO: 24.5 K/UL — HIGH (ref 1.8–8)
NEUTROPHILS NFR BLD AUTO: 95.8 % — HIGH (ref 37–73)
PHOSPHATE SERPL-MCNC: 3 MG/DL — SIGNIFICANT CHANGE UP (ref 2.4–4.7)
PLATELET # BLD AUTO: 259 K/UL — SIGNIFICANT CHANGE UP (ref 150–400)
POTASSIUM SERPL-MCNC: 4.4 MMOL/L — SIGNIFICANT CHANGE UP (ref 3.5–5.3)
POTASSIUM SERPL-SCNC: 4.4 MMOL/L — SIGNIFICANT CHANGE UP (ref 3.5–5.3)
PROCALCITONIN SERPL-MCNC: 0.34 NG/ML — HIGH (ref 0–0.04)
RBC # BLD: 4.38 M/UL — LOW (ref 4.4–5.2)
RBC # FLD: 16.7 % — HIGH (ref 11–15.6)
SODIUM SERPL-SCNC: 132 MMOL/L — LOW (ref 135–145)
WBC # BLD: 25.6 K/UL — HIGH (ref 4.8–10.8)
WBC # FLD AUTO: 25.6 K/UL — HIGH (ref 4.8–10.8)

## 2018-08-28 PROCEDURE — 99233 SBSQ HOSP IP/OBS HIGH 50: CPT

## 2018-08-28 PROCEDURE — 99223 1ST HOSP IP/OBS HIGH 75: CPT

## 2018-08-28 PROCEDURE — 99497 ADVNCD CARE PLAN 30 MIN: CPT | Mod: 25

## 2018-08-28 RX ORDER — SACCHAROMYCES BOULARDII 250 MG
250 POWDER IN PACKET (EA) ORAL
Qty: 0 | Refills: 0 | Status: DISCONTINUED | OUTPATIENT
Start: 2018-08-28 | End: 2018-09-03

## 2018-08-28 RX ORDER — LIDOCAINE 4 G/100G
2 CREAM TOPICAL DAILY
Qty: 0 | Refills: 0 | Status: DISCONTINUED | OUTPATIENT
Start: 2018-08-28 | End: 2018-08-30

## 2018-08-28 RX ORDER — ALPRAZOLAM 0.25 MG
0.25 TABLET ORAL ONCE
Qty: 0 | Refills: 0 | Status: DISCONTINUED | OUTPATIENT
Start: 2018-08-28 | End: 2018-08-28

## 2018-08-28 RX ORDER — HYDROMORPHONE HYDROCHLORIDE 2 MG/ML
0.5 INJECTION INTRAMUSCULAR; INTRAVENOUS; SUBCUTANEOUS
Qty: 0 | Refills: 0 | Status: DISCONTINUED | OUTPATIENT
Start: 2018-08-28 | End: 2018-09-02

## 2018-08-28 RX ORDER — MINERAL OIL
133 OIL (ML) MISCELLANEOUS ONCE
Qty: 0 | Refills: 0 | Status: COMPLETED | OUTPATIENT
Start: 2018-08-28 | End: 2018-08-28

## 2018-08-28 RX ADMIN — Medication 100 MILLIGRAM(S): at 17:14

## 2018-08-28 RX ADMIN — CEFEPIME 100 MILLIGRAM(S): 1 INJECTION, POWDER, FOR SOLUTION INTRAMUSCULAR; INTRAVENOUS at 00:40

## 2018-08-28 RX ADMIN — Medication 100 MILLIGRAM(S): at 06:15

## 2018-08-28 RX ADMIN — SIMVASTATIN 40 MILLIGRAM(S): 20 TABLET, FILM COATED ORAL at 00:40

## 2018-08-28 RX ADMIN — Medication 250 MILLIGRAM(S): at 17:14

## 2018-08-28 RX ADMIN — SIMVASTATIN 40 MILLIGRAM(S): 20 TABLET, FILM COATED ORAL at 22:35

## 2018-08-28 RX ADMIN — Medication 3 MILLILITER(S): at 00:37

## 2018-08-28 RX ADMIN — OXYCODONE HYDROCHLORIDE 5 MILLIGRAM(S): 5 TABLET ORAL at 17:15

## 2018-08-28 RX ADMIN — Medication 0.25 MILLIGRAM(S): at 13:18

## 2018-08-28 RX ADMIN — Medication 60 MILLIGRAM(S): at 23:36

## 2018-08-28 RX ADMIN — Medication 20 MILLIGRAM(S): at 17:14

## 2018-08-28 RX ADMIN — Medication 60 MILLIGRAM(S): at 05:48

## 2018-08-28 RX ADMIN — Medication 60 MILLIGRAM(S): at 00:40

## 2018-08-28 RX ADMIN — Medication 133 MILLILITER(S): at 20:34

## 2018-08-28 RX ADMIN — Medication 3 MILLILITER(S): at 21:50

## 2018-08-28 RX ADMIN — Medication 0.25 MILLIGRAM(S): at 22:35

## 2018-08-28 RX ADMIN — Medication 20 MILLIGRAM(S): at 05:48

## 2018-08-28 RX ADMIN — ALBUTEROL 2.5 MILLIGRAM(S): 90 AEROSOL, METERED ORAL at 21:32

## 2018-08-28 RX ADMIN — PANTOPRAZOLE SODIUM 40 MILLIGRAM(S): 20 TABLET, DELAYED RELEASE ORAL at 05:48

## 2018-08-28 RX ADMIN — CEFEPIME 100 MILLIGRAM(S): 1 INJECTION, POWDER, FOR SOLUTION INTRAMUSCULAR; INTRAVENOUS at 11:53

## 2018-08-28 RX ADMIN — Medication 30 MILLIGRAM(S): at 05:48

## 2018-08-28 RX ADMIN — Medication 3 MILLILITER(S): at 11:50

## 2018-08-28 RX ADMIN — Medication 60 MILLIGRAM(S): at 11:53

## 2018-08-28 RX ADMIN — OXYCODONE HYDROCHLORIDE 5 MILLIGRAM(S): 5 TABLET ORAL at 18:35

## 2018-08-28 RX ADMIN — Medication 60 MILLIGRAM(S): at 17:14

## 2018-08-28 RX ADMIN — Medication 3 MILLILITER(S): at 04:03

## 2018-08-28 RX ADMIN — SODIUM CHLORIDE 75 MILLILITER(S): 9 INJECTION INTRAMUSCULAR; INTRAVENOUS; SUBCUTANEOUS at 11:53

## 2018-08-28 RX ADMIN — Medication 0.25 MILLIGRAM(S): at 05:47

## 2018-08-28 RX ADMIN — Medication 3 MILLILITER(S): at 08:59

## 2018-08-28 RX ADMIN — Medication 3 MILLILITER(S): at 15:34

## 2018-08-28 RX ADMIN — Medication 10 MILLIGRAM(S): at 17:14

## 2018-08-28 RX ADMIN — ENOXAPARIN SODIUM 40 MILLIGRAM(S): 100 INJECTION SUBCUTANEOUS at 05:48

## 2018-08-28 NOTE — PROGRESS NOTE ADULT - ASSESSMENT
Patient is a 85 year old female with PMH of COPD, Lung CA (s/p radiation therapy), Anxiety, and HTN who presented to the ED with worsening shortness of breath since last night due to COPD exacerbation and progression of underlying lung tumor.    1. Acute hypoxic respiratory failure   -secondary to COPD exacerbation with likely underlying PNA and progression of Pancoast Tumor  -continue supplemental O2 and bronchodilators  -continue IV steroids  -continue broad spectrum abx given concern for right sided pneumonia and elevated procal  -nocturnal and PRN bipap  -CT chest with progression of lung tumor with destruction of left sided chest wall    2. 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 Mayberry before deciding on hospice services    3. Anxiety  -Xanax PRN    4. HTN  -continue home medications    5. Hypovolemic Hyponatremia; mild  -likely due to low solute intake and hypovolemia  -continue judicious hydration  -check serum osm and urine lytes  -check TSH and uric acid  -repeat BMP in AM  -strict I/Os     6. Dysphagia  -SLP consulted  -continue pureed diet    DVT prophylaxis - heparin SC    Patient is DNR/DNI with very poor prognosis. Patient would like to be kept comfortable. Palliative and Hospice consult requested. Lengthy discussion held with patient's daughter after she met with hospice team. Daughter anxious and overwhelmed with progression of patient's disease. Very tearful. States she had recently discussed outpatient PET results with Dr. Mayberry and would like to discuss immunotherapy option with Dr. Mayberry before deciding on hospice services. Daughter aware that her mother has chosen to be DNR/DNI and is in agreement. Patient also informed that mother expressed wishes to be only kept comfortable. Daughter emotional and understands that if immunotherapy is not an option that hospice would be the best option for her mother. All questions answered and emotional support provided. Time spent on conversation 60 minutes.

## 2018-08-28 NOTE — CONSULT NOTE ADULT - SUBJECTIVE AND OBJECTIVE BOX
HPI: This is 84 yo cachectic Female PMH of Lung Cancer and COPD just completed RT treatments x10. Extremely weak and  anxious; SOB and intermittent  cough. She is trembling, RUE peripheral IV infiltrated, which has upset her. Losing weight, not eating much  She was in distress in ED needed BIPAP support and Nebulizer treatments L Chest wall pain and destruction-disease progression.   Patient wanting DNR and aggressive pain management  Currently, patient denies chest pain, lightheadedness, dizziness, nausea/vomiting, abdominal pain, diarrhea, fevers or chills. Labs significant for leukocytosis but patient on chronic steroids at home. Patient does report an intermittent episodes of a productive cough.   CC: Anxiety, SOB at rest Lung cancer    PERTINENT PMH REVIEWED: Yes     PAST MEDICAL & SURGICAL HISTORY:    CT chest negative for PE with progression of disease and destruction of left chest wall and evidence of liver mets.   Patient initially in severe left sided chest wall pain and anxious. Pain significantly improved with IV dilaudid  Risk factors for obstructive sleep apnea  Cancer: lung  COPD (chronic obstructive pulmonary disease)  Hypertension  History of cataract surgery  Anal fistula  S/P partial lobectomy of lung: Right      SOCIAL HISTORY:  EtOH    No                                    Drugs    No                                    nonsmoker                                    Admitted from: home  Living with daughter Shital [phone]    FAMILY HISTORY:  No pertinent family history in first degree relatives    No Known Allergies  Intolerances    Baseline ADLs (prior to admission):   Dependent      Present Symptoms:     Dyspnea:  3   Nausea/Vomiting:  No  Anxiety:  Yes  Depression: Yes   Fatigue: Yes   Loss of appetite: Yes     Pain: L chest wall, Rib cage and back            Character-sharp stabbing            Duration-constant intermittent            Effect-            Factors-            Frequency-            Location-            Severity-mild at present    Review of Systems: Reviewed                       All others negative    MEDICATIONS  (STANDING):  ALBUTerol/ipratropium for Nebulization 3 milliLiter(s) Nebulizer every 4 hours  ALPRAZolam 0.25 milliGRAM(s) Oral two times a day  aspirin enteric coated 81 milliGRAM(s) Oral daily  cefepime   IVPB      cefepime   IVPB 1000 milliGRAM(s) IV Intermittent every 12 hours  enoxaparin Injectable 40 milliGRAM(s) SubCutaneous every 24 hours  fentaNYL   Patch  12 MICROgram(s)/Hr. 1 Patch Transdermal every 48 hours  FLUoxetine 20 milliGRAM(s) Oral two times a day  methylPREDNISolone sodium succinate Injectable 60 milliGRAM(s) IV Push every 6 hours  NIFEdipine XL 30 milliGRAM(s) Oral daily  pantoprazole    Tablet 40 milliGRAM(s) Oral before breakfast  saccharomyces boulardii 250 milliGRAM(s) Oral two times a day  simvastatin 40 milliGRAM(s) Oral at bedtime  sodium chloride 0.9%. 1000 milliLiter(s) (75 mL/Hr) IV Continuous <Continuous>  vancomycin  IVPB 500 milliGRAM(s) IV Intermittent every 12 hours    MEDICATIONS  (PRN):  ALBUTerol    0.083% 2.5 milliGRAM(s) Nebulizer every 2 hours PRN Shortness of Breath and/or Wheezing  morphine  - Injectable 2 milliGRAM(s) IV Push every 6 hours PRN Severe Pain (7 - 10)  oxyCODONE    IR 5 milliGRAM(s) Oral every 4 hours PRN Moderate Pain (4 - 6)    PHYSICAL EXAM:    Vital Signs Last 24 Hrs  T(C): 36.7 (28 Aug 2018 15:43), Max: 37.2 (28 Aug 2018 01:11)  T(F): 98.1 (28 Aug 2018 15:43), Max: 98.9 (28 Aug 2018 01:11)  HR: 103 (28 Aug 2018 15:43) (83 - 110)  BP: 137/80 (28 Aug 2018 15:43) (110/69 - 141/76)  BP(mean): --  RR: 18 (28 Aug 2018 15:43) (18 - 22)  SpO2: 96% (28 Aug 2018 15:43) (85% - 100%)    General: alert  oriented x __3__ anxious                  cachexia    HEENT:   dry mouth      Lungs: tachypnea/labored breathing      CV:  tachycardia    GI: distended                 constipation  last BM: before admission    : normal  incontinent      MSK:   weakness        bedbound>BR    Skin: normal    LABS:                        11.0   25.6  )-----------( 259      ( 28 Aug 2018 06:02 )             35.2     08-28    132<L>  |  92<L>  |  19.0  ----------------------------<  124<H>  4.4   |  25.0  |  0.34<L>    Ca    8.6      28 Aug 2018 06:02  Phos  3.0     08-28  Mg     2.1     08-28    TPro  5.9<L>  /  Alb  3.0<L>  /  TBili  0.3<L>  /  DBili  x   /  AST  17  /  ALT  33<H>  /  AlkPhos  109  08-27    PT/INR - ( 27 Aug 2018 10:37 )   PT: 11.7 sec;   INR: 1.06 ratio         PTT - ( 27 Aug 2018 10:37 )  PTT:25.8 sec    I&O's Summary    < from: CT Angio Chest w/ IV Cont (08.27.18 @ 16:01) >  MPRESSION:  No evidence of central pulmonary emboli.    Progression of disease with large left upper lobe Pancoast tumor with   destruction of the adjacent posterior medial chest wall, left posterior   upper rib and adjacent vertebrae with extension of disease into the left   neural foramen. Multiple bilateral metastatic nodules in the lungs.    Extensive new metastatic disease in the liver predominantly involving the   left lobe and bilateraladrenal metastasis.      RADIOLOGY & ADDITIONAL STUDIES:    ADVANCE DIRECTIVES:   DNR YES  Completed on:                     MOLST  YES  Completed on: 08/28/2018  Living Will  YES   Completed on:

## 2018-08-28 NOTE — CONSULT NOTE ADULT - PROBLEM SELECTOR RECOMMENDATION 5
Dulcolax suppository  Follow with oil enema if needed  Movantic 12mg PO if still no BM For opioid induced constipation

## 2018-08-28 NOTE — SWALLOW BEDSIDE ASSESSMENT ADULT - SLP PERTINENT HISTORY OF CURRENT PROBLEM
Pt reports difficulty tolerating solids PTA. It should be noted that pt reports anxiety related to her swallowing. Coughing reported with intake of meds this morning with applesauce as per RN, however pt reported it was due to not having enough applesauce at the time.

## 2018-08-28 NOTE — SWALLOW BEDSIDE ASSESSMENT ADULT - COMMENTS
As per H&P:   Patient is a 85 year old female with PMH of COPD, Lung CA (s/p radiation therapy), Anxiety, and HTN who presented to the ED with worsening shortness of breath since last night due to COPD exacerbation and progression of underlying lung tumor.

## 2018-08-28 NOTE — SWALLOW BEDSIDE ASSESSMENT ADULT - SLP GENERAL OBSERVATIONS
Pt received & seen seated upright via stretcher in ED, +awake/alert, +oriented, +baseline cough, +O2 NC

## 2018-08-28 NOTE — CONSULT NOTE ADULT - PROBLEM SELECTOR RECOMMENDATION 2
Fentanyl 12 mcg/hr patch  Dilaudid IV for BTP is effective  Lidoderm patches    Treat Constipation-opiod induced not eating  Oxycodone IR 5mg prn

## 2018-08-28 NOTE — SWALLOW BEDSIDE ASSESSMENT ADULT - ASR SWALLOW ASPIRATION MONITOR
cough/pneumonia/throat clearing/upper respiratory infection/change of breathing pattern/position upright (90Y)/gurgly voice/fever/oral hygiene

## 2018-08-28 NOTE — CONSULT NOTE ADULT - PROBLEM SELECTOR RECOMMENDATION 9
Completed Radiation Treatments x 10 on Friday at [facility].  Extremely weak and tired  Oxygen Nasal Canula  Neb treatments

## 2018-08-28 NOTE — SWALLOW BEDSIDE ASSESSMENT ADULT - SWALLOW EVAL: RECOMMENDED FEEDING/EATING TECHNIQUES
allow for swallow between intakes/crush medication (when feasible)/small sips/bites/position upright (90 degrees)

## 2018-08-28 NOTE — CONSULT NOTE ADULT - ATTENDING COMMENTS
COUNSELING:    Face to face meeting to discuss Advanced Care Planning - Time Spent ______ Minutes.     More than 50% time spent in counseling and coordinating care. _60_____ Minutes.     Thank you for the opportunity to assist with the care of this patient.   [facility] [phone].  Met with daughter Shital ANTONIO, discussed recent decline, weakness pain, met with Hospice earlier today,   will be providing support services at home. Reconfirmed DNR with Patient and daughter who signed  the MOLST Directive

## 2018-08-28 NOTE — PROGRESS NOTE ADULT - SUBJECTIVE AND OBJECTIVE BOX
CHIEF COMPLAINT/INTERVAL HISTORY:    Patient is a 85y old  Female who presents with a chief complaint of SOB (27 Aug 2018 19:54    SUBJECTIVE & OBJECTIVE: Pt seen and examined at bedside. No overnight events. Patient reports feeling better today. Less anxious with better pain control.    ROS: No chest pain, palpitations, light headedness, dizziness, headache, nausea/vomiting, fevers/chills, abdominal pain, dysuria or increased urinary frequency.    ICU Vital Signs Last 24 Hrs  T(C): 36.7 (28 Aug 2018 15:43), Max: 37.2 (28 Aug 2018 01:11)  T(F): 98.1 (28 Aug 2018 15:43), Max: 98.9 (28 Aug 2018 01:11)  HR: 103 (28 Aug 2018 15:43) (83 - 110)  BP: 137/80 (28 Aug 2018 15:43) (110/69 - 141/76)  RR: 18 (28 Aug 2018 15:43) (18 - 22)  SpO2: 96% (28 Aug 2018 15:43) (85% - 100%)    MEDICATIONS  (STANDING):  ALBUTerol/ipratropium for Nebulization 3 milliLiter(s) Nebulizer every 4 hours  ALPRAZolam 0.25 milliGRAM(s) Oral two times a day  aspirin enteric coated 81 milliGRAM(s) Oral daily  cefepime   IVPB      cefepime   IVPB 1000 milliGRAM(s) IV Intermittent every 12 hours  enoxaparin Injectable 40 milliGRAM(s) SubCutaneous every 24 hours  fentaNYL   Patch  12 MICROgram(s)/Hr. 1 Patch Transdermal every 48 hours  FLUoxetine 20 milliGRAM(s) Oral two times a day  methylPREDNISolone sodium succinate Injectable 60 milliGRAM(s) IV Push every 6 hours  NIFEdipine XL 30 milliGRAM(s) Oral daily  pantoprazole    Tablet 40 milliGRAM(s) Oral before breakfast  saccharomyces boulardii 250 milliGRAM(s) Oral two times a day  simvastatin 40 milliGRAM(s) Oral at bedtime  sodium chloride 0.9%. 1000 milliLiter(s) (75 mL/Hr) IV Continuous <Continuous>  vancomycin  IVPB 500 milliGRAM(s) IV Intermittent every 12 hours    MEDICATIONS  (PRN):  ALBUTerol    0.083% 2.5 milliGRAM(s) Nebulizer every 2 hours PRN Shortness of Breath and/or Wheezing  morphine  - Injectable 2 milliGRAM(s) IV Push every 6 hours PRN Severe Pain (7 - 10)  oxyCODONE    IR 5 milliGRAM(s) Oral every 4 hours PRN Moderate Pain (4 - 6)      LABS:                        11.0   25.6  )-----------( 259      ( 28 Aug 2018 06:02 )             35.2     08-28    132<L>  |  92<L>  |  19.0  ----------------------------<  124<H>  4.4   |  25.0  |  0.34<L>    Ca    8.6      28 Aug 2018 06:02  Phos  3.0     08-28  Mg     2.1     08-28    TPro  5.9<L>  /  Alb  3.0<L>  /  TBili  0.3<L>  /  DBili  x   /  AST  17  /  ALT  33<H>  /  AlkPhos  109  08-27    PT/INR - ( 27 Aug 2018 10:37 )   PT: 11.7 sec;   INR: 1.06 ratio         PTT - ( 27 Aug 2018 10:37 )  PTT:25.8 sec    PHYSICAL EXAM:    GENERAL: elderly, frail, cachetic female   HEAD:  Atraumatic, Normocephalic  EYES: EOMI, PERRLA, conjunctiva and sclera clear  ENMT: Moist mucous membranes  NECK: Supple   NERVOUS SYSTEM:  Alert & Oriented X3   CHEST/LUNG: + expiratory wheezing  HEART: Regular rate and rhythm; + S1/S2  ABDOMEN: Soft, Nontender, Nondistended; Bowel sounds present  EXTREMITIES:  no pedal edema

## 2018-08-29 DIAGNOSIS — R06.02 SHORTNESS OF BREATH: ICD-10-CM

## 2018-08-29 DIAGNOSIS — Z71.89 OTHER SPECIFIED COUNSELING: ICD-10-CM

## 2018-08-29 LAB
ANION GAP SERPL CALC-SCNC: 13 MMOL/L — SIGNIFICANT CHANGE UP (ref 5–17)
ANISOCYTOSIS BLD QL: SLIGHT — SIGNIFICANT CHANGE UP
BUN SERPL-MCNC: 16 MG/DL — SIGNIFICANT CHANGE UP (ref 8–20)
CALCIUM SERPL-MCNC: 8.6 MG/DL — SIGNIFICANT CHANGE UP (ref 8.6–10.2)
CHLORIDE SERPL-SCNC: 95 MMOL/L — LOW (ref 98–107)
CO2 SERPL-SCNC: 28 MMOL/L — SIGNIFICANT CHANGE UP (ref 22–29)
CREAT SERPL-MCNC: 0.37 MG/DL — LOW (ref 0.5–1.3)
GLUCOSE SERPL-MCNC: 143 MG/DL — HIGH (ref 70–115)
HCT VFR BLD CALC: 36 % — LOW (ref 37–47)
HGB BLD-MCNC: 11 G/DL — LOW (ref 12–16)
LYMPHOCYTES # BLD AUTO: 3 % — LOW (ref 20–55)
MAGNESIUM SERPL-MCNC: 2.1 MG/DL — SIGNIFICANT CHANGE UP (ref 1.6–2.6)
MCHC RBC-ENTMCNC: 24.7 PG — LOW (ref 27–31)
MCHC RBC-ENTMCNC: 30.6 G/DL — LOW (ref 32–36)
MCV RBC AUTO: 80.7 FL — LOW (ref 81–99)
METAMYELOCYTES # FLD: 1 % — HIGH (ref 0–0)
MICROCYTES BLD QL: SLIGHT — SIGNIFICANT CHANGE UP
MONOCYTES NFR BLD AUTO: 4 % — SIGNIFICANT CHANGE UP (ref 3–10)
NEUTROPHILS NFR BLD AUTO: 92 % — HIGH (ref 37–73)
OSMOLALITY SERPL: 305 MOSM/KG — HIGH (ref 280–300)
PHOSPHATE SERPL-MCNC: 3.4 MG/DL — SIGNIFICANT CHANGE UP (ref 2.4–4.7)
PLAT MORPH BLD: NORMAL — SIGNIFICANT CHANGE UP
PLATELET # BLD AUTO: 252 K/UL — SIGNIFICANT CHANGE UP (ref 150–400)
POTASSIUM SERPL-MCNC: 4 MMOL/L — SIGNIFICANT CHANGE UP (ref 3.5–5.3)
POTASSIUM SERPL-SCNC: 4 MMOL/L — SIGNIFICANT CHANGE UP (ref 3.5–5.3)
RBC # BLD: 4.46 M/UL — SIGNIFICANT CHANGE UP (ref 4.4–5.2)
RBC # FLD: 17.1 % — HIGH (ref 11–15.6)
RBC BLD AUTO: ABNORMAL
SODIUM SERPL-SCNC: 136 MMOL/L — SIGNIFICANT CHANGE UP (ref 135–145)
TSH SERPL-MCNC: 0.64 UIU/ML — SIGNIFICANT CHANGE UP (ref 0.27–4.2)
URATE SERPL-MCNC: 4.4 MG/DL — SIGNIFICANT CHANGE UP (ref 2.4–5.7)
VANCOMYCIN TROUGH SERPL-MCNC: <4 UG/ML — LOW (ref 10–20)
WBC # BLD: 33.9 K/UL — HIGH (ref 4.8–10.8)
WBC # FLD AUTO: 33.9 K/UL — HIGH (ref 4.8–10.8)

## 2018-08-29 PROCEDURE — 99232 SBSQ HOSP IP/OBS MODERATE 35: CPT

## 2018-08-29 PROCEDURE — 99233 SBSQ HOSP IP/OBS HIGH 50: CPT

## 2018-08-29 RX ADMIN — CEFEPIME 100 MILLIGRAM(S): 1 INJECTION, POWDER, FOR SOLUTION INTRAMUSCULAR; INTRAVENOUS at 11:17

## 2018-08-29 RX ADMIN — Medication 60 MILLIGRAM(S): at 23:20

## 2018-08-29 RX ADMIN — Medication 60 MILLIGRAM(S): at 11:09

## 2018-08-29 RX ADMIN — Medication 60 MILLIGRAM(S): at 17:34

## 2018-08-29 RX ADMIN — Medication 250 MILLIGRAM(S): at 17:34

## 2018-08-29 RX ADMIN — SODIUM CHLORIDE 75 MILLILITER(S): 9 INJECTION INTRAMUSCULAR; INTRAVENOUS; SUBCUTANEOUS at 11:17

## 2018-08-29 RX ADMIN — MORPHINE SULFATE 2 MILLIGRAM(S): 50 CAPSULE, EXTENDED RELEASE ORAL at 13:00

## 2018-08-29 RX ADMIN — Medication 0.25 MILLIGRAM(S): at 15:50

## 2018-08-29 RX ADMIN — Medication 3 MILLILITER(S): at 11:39

## 2018-08-29 RX ADMIN — Medication 3 MILLILITER(S): at 01:06

## 2018-08-29 RX ADMIN — Medication 3 MILLILITER(S): at 16:55

## 2018-08-29 RX ADMIN — FENTANYL CITRATE 1 PATCH: 50 INJECTION INTRAVENOUS at 23:06

## 2018-08-29 RX ADMIN — MORPHINE SULFATE 2 MILLIGRAM(S): 50 CAPSULE, EXTENDED RELEASE ORAL at 11:09

## 2018-08-29 RX ADMIN — Medication 3 MILLILITER(S): at 20:50

## 2018-08-29 RX ADMIN — Medication 81 MILLIGRAM(S): at 11:17

## 2018-08-29 RX ADMIN — Medication 20 MILLIGRAM(S): at 17:34

## 2018-08-29 RX ADMIN — Medication 100 MILLIGRAM(S): at 18:44

## 2018-08-29 RX ADMIN — CEFEPIME 100 MILLIGRAM(S): 1 INJECTION, POWDER, FOR SOLUTION INTRAMUSCULAR; INTRAVENOUS at 00:39

## 2018-08-29 RX ADMIN — SIMVASTATIN 40 MILLIGRAM(S): 20 TABLET, FILM COATED ORAL at 22:16

## 2018-08-29 NOTE — PROGRESS NOTE ADULT - SUBJECTIVE AND OBJECTIVE BOX
CHIEF COMPLAINT/INTERVAL HISTORY:    Patient is a 85y old  Female who presents with a chief complaint of SOB (27 Aug 2018 19:54)    SUBJECTIVE & OBJECTIVE: Pt seen and examined at bedside. Patient agitated overnight; refusing medications and vitals this morning. Patient initially refused to be evaluated but agreeable later today. Patient and daughter met with Dr. Mayberry and are agreeable to hospice. Complaining of left chest wall pain and SOB; encouraged to allow nurse to administer her medications.    ROS: No palpitations, SOB, light headedness, dizziness, headache, nausea/vomiting, fevers/chills, abdominal pain, dysuria.    ICU Vital Signs Last 24 Hrs  T(C): 36.7 (28 Aug 2018 23:02), Max: 36.7 (28 Aug 2018 15:43)  T(F): 98.1 (28 Aug 2018 23:02), Max: 98.1 (28 Aug 2018 15:43)  HR: 91 (29 Aug 2018 11:39) (91 - 103)  BP: 146/80 (28 Aug 2018 23:02) (137/80 - 146/80)  RR: 18 (28 Aug 2018 23:02) (18 - 18)  SpO2: 95% (29 Aug 2018 11:39) (95% - 97%)    MEDICATIONS  (STANDING):  ALBUTerol/ipratropium for Nebulization 3 milliLiter(s) Nebulizer every 4 hours  ALPRAZolam 0.25 milliGRAM(s) Oral two times a day  aspirin enteric coated 81 milliGRAM(s) Oral daily  cefepime   IVPB      cefepime   IVPB 1000 milliGRAM(s) IV Intermittent every 12 hours  enoxaparin Injectable 40 milliGRAM(s) SubCutaneous every 24 hours  fentaNYL   Patch  12 MICROgram(s)/Hr. 1 Patch Transdermal every 48 hours  FLUoxetine 20 milliGRAM(s) Oral two times a day  lidocaine   Patch 2 Patch Transdermal daily  methylPREDNISolone sodium succinate Injectable 60 milliGRAM(s) IV Push every 6 hours  NIFEdipine XL 30 milliGRAM(s) Oral daily  pantoprazole    Tablet 40 milliGRAM(s) Oral before breakfast  saccharomyces boulardii 250 milliGRAM(s) Oral two times a day  simvastatin 40 milliGRAM(s) Oral at bedtime  sodium chloride 0.9%. 1000 milliLiter(s) (75 mL/Hr) IV Continuous <Continuous>  vancomycin  IVPB 500 milliGRAM(s) IV Intermittent every 12 hours    MEDICATIONS  (PRN):  ALBUTerol    0.083% 2.5 milliGRAM(s) Nebulizer every 2 hours PRN Shortness of Breath and/or Wheezing  HYDROmorphone  Injectable 0.5 milliGRAM(s) IV Push every 3 hours PRN Severe Pain (7 - 10)  morphine  - Injectable 2 milliGRAM(s) IV Push every 6 hours PRN Severe Pain (7 - 10)  oxyCODONE    IR 5 milliGRAM(s) Oral every 4 hours PRN Moderate Pain (4 - 6)      LABS:                        11.0   25.6  )-----------( 259      ( 28 Aug 2018 06:02 )             35.2     08-28    132<L>  |  92<L>  |  19.0  ----------------------------<  124<H>  4.4   |  25.0  |  0.34<L>    Ca    8.6      28 Aug 2018 06:02  Phos  3.0     08-28  Mg     2.1     08-28      PHYSICAL EXAM:    GENERAL: elderly, frail, cachetic female   HEAD:  Atraumatic, Normocephalic  EYES: EOMI, PERRLA, conjunctiva and sclera clear  ENMT: Moist mucous membranes  NECK: Supple   NERVOUS SYSTEM:  Alert & Oriented X3   CHEST/LUNG: + expiratory wheezing  HEART: Regular rate and rhythm; + S1/S2  ABDOMEN: Soft, Nontender, Nondistended; Bowel sounds present  EXTREMITIES:  no pedal edema

## 2018-08-29 NOTE — PROGRESS NOTE ADULT - ASSESSMENT
Patient is a 85 year old female with PMH of COPD, Lung CA (s/p radiation therapy), Anxiety, and HTN who presented to the ED with worsening shortness of breath since last night due to COPD exacerbation and progression of underlying lung tumor.    1. Acute hypoxic respiratory failure   -secondary to COPD exacerbation with likely underlying PNA and progression of Pancoast Tumor  -continue supplemental O2 and bronchodilators  -continue IV steroids  -continue broad spectrum abx given concern for right sided pneumonia and elevated procal  -nocturnal and PRN bipap  -CT chest with progression of lung tumor and invasion of the left chest wall    2. Metastatic NSCLC  -s/p palliative RT  -patient with severe left sided chest pain due to invasion of tumor into the chest wall  -Recent PET CT showed widely metastatic disease with possible involvement of central spinal canal at T6.   -patient with uncontrolled pain today after refusing all PO/IV medications  -encouraged patient to accept medications to which she is now agreeable  -palliative care and hospice consults appreciated  -patient and daughter met with Dr. Mayberry today and were informed there would be limited benefit of immunotherapy and they are now agreeable to hospice services  -hospice team on board; patient would be most appropriate for inpatient hospice    3. Anxiety  -Xanax PRN    4. HTN  -continue home medications    5. Hypovolemic Hyponatremia; mild  -likely due to low solute intake and hypovolemia  -continue judicious hydration  -patient refused labs this morning and now is agreeable to hospice services  -no further lab draws    6. Dysphagia  -SLP consulted  -continue pureed diet     DVT prophylaxis - heparin SC

## 2018-08-29 NOTE — PROGRESS NOTE ADULT - SUBJECTIVE AND OBJECTIVE BOX
REASON FOR CONSULTATION:     HPI:  Patient is a 85 year old female with PMH of COPD, Lung CA (s/p palliative radiation therapy), Anxiety, and HTN who presented to the ED with worsening shortness of breath x 1 day prior to admission.  Per EMS, patient was hypoxic at home and then placed on cpap support with some improvement. In the ED, patient was given 2 nebulizer treatments with improvement in chest tightness. CT chest negative for PE with progression of disease and destruction of left chest wall and evidence of liver mets. Patient initially in severe left sided chest wall pain and anxious. Pain significantly improved with IV dilaudid. Patient requesting to be DNR/DNI and interested in comfort measures only. Hospice and palliative care consults placed. Currently, patient denies chest pain, lightheadedness, dizziness, nausea/vomiting, abdominal pain, diarrhea, fevers or chills. Labs significant for leukocytosis but patient on chronic steroids at home. Patient does report an intermittent episodes of a productive cough. (27 Aug 2018 19:54)      REVIEW OF SYSTEMS:  Constitutional, Eyes, ENT, Cardiovascular, Respiratory, Gastrointestinal, Genitourinary, Musculoskeletal, Integumentary, Neurological, Psychiatric, Endocrine, Heme/Lymph, and Allergic/Immunologic review of systems are otherwise negative except as noted in the HPI.    PAST MEDICAL & SURGICAL HISTORY:  Cataract  Risk factors for obstructive sleep apnea  Cancer: lung  COPD (chronic obstructive pulmonary disease)  Hypertension  History of cataract surgery  Anal fistula  S/P partial lobectomy of lung: Right      FAMILY HISTORY:  No pertinent family history in first degree relatives      SOCIAL HISTORY:    Allergies    No Known Allergies    Intolerances        MEDICATIONS  (STANDING):  ALBUTerol/ipratropium for Nebulization 3 milliLiter(s) Nebulizer every 4 hours  ALPRAZolam 0.25 milliGRAM(s) Oral two times a day  aspirin enteric coated 81 milliGRAM(s) Oral daily  cefepime   IVPB      cefepime   IVPB 1000 milliGRAM(s) IV Intermittent every 12 hours  enoxaparin Injectable 40 milliGRAM(s) SubCutaneous every 24 hours  fentaNYL   Patch  12 MICROgram(s)/Hr. 1 Patch Transdermal every 48 hours  FLUoxetine 20 milliGRAM(s) Oral two times a day  lidocaine   Patch 2 Patch Transdermal daily  methylPREDNISolone sodium succinate Injectable 60 milliGRAM(s) IV Push every 6 hours  NIFEdipine XL 30 milliGRAM(s) Oral daily  pantoprazole    Tablet 40 milliGRAM(s) Oral before breakfast  saccharomyces boulardii 250 milliGRAM(s) Oral two times a day  simvastatin 40 milliGRAM(s) Oral at bedtime  sodium chloride 0.9%. 1000 milliLiter(s) (75 mL/Hr) IV Continuous <Continuous>  vancomycin  IVPB 500 milliGRAM(s) IV Intermittent every 12 hours    MEDICATIONS  (PRN):  ALBUTerol    0.083% 2.5 milliGRAM(s) Nebulizer every 2 hours PRN Shortness of Breath and/or Wheezing  HYDROmorphone  Injectable 0.5 milliGRAM(s) IV Push every 3 hours PRN Severe Pain (7 - 10)  morphine  - Injectable 2 milliGRAM(s) IV Push every 6 hours PRN Severe Pain (7 - 10)  oxyCODONE    IR 5 milliGRAM(s) Oral every 4 hours PRN Moderate Pain (4 - 6)      Vital Signs Last 24 Hrs  T(C): 36.7 (28 Aug 2018 23:02), Max: 36.7 (28 Aug 2018 11:13)  T(F): 98.1 (28 Aug 2018 23:02), Max: 98.1 (28 Aug 2018 11:13)  HR: 96 (29 Aug 2018 01:05) (95 - 110)  BP: 146/80 (28 Aug 2018 23:02) (110/69 - 146/80)  BP(mean): --  RR: 18 (28 Aug 2018 23:02) (18 - 20)  SpO2: 96% (29 Aug 2018 01:05) (90% - 97%)    PHYSICAL EXAM:    GENERAL: NAD, well-groomed, well-developed  HEAD:  Atraumatic, Normocephalic  EYES: EOMI, PERRLA, conjunctiva and sclera clear  ENMT: No tonsillar erythema, exudates, or enlargement; Moist mucous membranes, Good dentition, No lesions  NECK: Supple, No JVD, Normal thyroid  NERVOUS SYSTEM:    CHEST/LUNG: Clear to auscultation bilaterally; No rales, rhonchi, wheezing, or rubs  HEART: Regular rate and rhythm; No murmurs, rubs, or gallops  ABDOMEN: Soft, Nontender, Nondistended; Bowel sounds present  EXTREMITIES:  2+ Peripheral Pulses, No clubbing, cyanosis, or edema  LYMPH: No lymphadenopathy noted  SKIN: No rashes or lesions      LABS:                        11.0   25.6  )-----------( 259      ( 28 Aug 2018 06:02 )             35.2     08-28    132<L>  |  92<L>  |  19.0  ----------------------------<  124<H>  4.4   |  25.0  |  0.34<L>    Ca    8.6      28 Aug 2018 06:02  Phos  3.0     08-28  Mg     2.1     08-28              RADIOLOGY & ADDITIONAL STUDIES:  < from: NM PET/CT Onc FDG Skull to Thigh, Subsq (08.23.18 @ 11:16) >  IMPRESSION:      1.  FDG avid mass superior segment left lower lobe consistent with   malignancy, with involvement of the left posterior chest wall. New   pathologic compression fracture  T6 vertebral body with new destruction    left lamina and transverse process of T6 with FDG avidity extending into   the central spinal canal. Suggest further evaluation with T-spine MRI.     2.  Multiple new FDG avid pulmonary nodules and mediastinal   lymphadenopathy, consistent with metastatic disease.    3.  New bilateral FDG avid adrenal masses, bilobar hepatic lesions and   multiple peritoneal soft tissue nodules, suspicious for metastatic   disease.    4.  FDG avid soft tissue nodule deep to the right gluteal musculature,   suspicious for metastasis.    5.  Unchanged size of mildly FDG avid partially calcified anterior   mediastinal mass.    < end of copied text > REASON FOR CONSULTATION:     HPI:  Patient is a 85 year old female with PMH of COPD, Lung CA (s/p palliative radiation therapy), Anxiety, and HTN who presented to the ED with worsening shortness of breath x 1 day prior to admission.  Per EMS, patient was hypoxic at home and then placed on cpap support with some improvement. In the ED, patient was given 2 nebulizer treatments with improvement in chest tightness. CT chest negative for PE with progression of disease and destruction of left chest wall and evidence of liver mets. Patient initially in severe left sided chest wall pain and anxious. Pain significantly improved with IV dilaudid. Patient requesting to be DNR/DNI and interested in comfort measures only. Hospice and palliative care consults placed. Currently, patient denies chest pain, lightheadedness, dizziness, nausea/vomiting, abdominal pain, diarrhea, fevers or chills. Labs significant for leukocytosis but patient on chronic steroids at home. Patient does report an intermittent episodes of a productive cough. (27 Aug 2018 19:54)      REVIEW OF SYSTEMS:  Constitutional, Eyes, ENT, Cardiovascular, Respiratory, Gastrointestinal, Genitourinary, Musculoskeletal, Integumentary, Neurological, Psychiatric, Endocrine, Heme/Lymph, and Allergic/Immunologic review of systems are otherwise negative except as noted in the HPI.    PAST MEDICAL & SURGICAL HISTORY:  Cataract  Risk factors for obstructive sleep apnea  Cancer: lung  COPD (chronic obstructive pulmonary disease)  Hypertension  History of cataract surgery  Anal fistula  S/P partial lobectomy of lung: Right      FAMILY HISTORY:  No pertinent family history in first degree relatives      SOCIAL HISTORY:    Allergies    No Known Allergies    Intolerances        MEDICATIONS  (STANDING):  ALBUTerol/ipratropium for Nebulization 3 milliLiter(s) Nebulizer every 4 hours  ALPRAZolam 0.25 milliGRAM(s) Oral two times a day  aspirin enteric coated 81 milliGRAM(s) Oral daily  cefepime   IVPB      cefepime   IVPB 1000 milliGRAM(s) IV Intermittent every 12 hours  enoxaparin Injectable 40 milliGRAM(s) SubCutaneous every 24 hours  fentaNYL   Patch  12 MICROgram(s)/Hr. 1 Patch Transdermal every 48 hours  FLUoxetine 20 milliGRAM(s) Oral two times a day  lidocaine   Patch 2 Patch Transdermal daily  methylPREDNISolone sodium succinate Injectable 60 milliGRAM(s) IV Push every 6 hours  NIFEdipine XL 30 milliGRAM(s) Oral daily  pantoprazole    Tablet 40 milliGRAM(s) Oral before breakfast  saccharomyces boulardii 250 milliGRAM(s) Oral two times a day  simvastatin 40 milliGRAM(s) Oral at bedtime  sodium chloride 0.9%. 1000 milliLiter(s) (75 mL/Hr) IV Continuous <Continuous>  vancomycin  IVPB 500 milliGRAM(s) IV Intermittent every 12 hours    MEDICATIONS  (PRN):  ALBUTerol    0.083% 2.5 milliGRAM(s) Nebulizer every 2 hours PRN Shortness of Breath and/or Wheezing  HYDROmorphone  Injectable 0.5 milliGRAM(s) IV Push every 3 hours PRN Severe Pain (7 - 10)  morphine  - Injectable 2 milliGRAM(s) IV Push every 6 hours PRN Severe Pain (7 - 10)  oxyCODONE    IR 5 milliGRAM(s) Oral every 4 hours PRN Moderate Pain (4 - 6)      Vital Signs Last 24 Hrs  T(C): 36.7 (28 Aug 2018 23:02), Max: 36.7 (28 Aug 2018 11:13)  T(F): 98.1 (28 Aug 2018 23:02), Max: 98.1 (28 Aug 2018 11:13)  HR: 96 (29 Aug 2018 01:05) (95 - 110)  BP: 146/80 (28 Aug 2018 23:02) (110/69 - 146/80)  BP(mean): --  RR: 18 (28 Aug 2018 23:02) (18 - 20)  SpO2: 96% (29 Aug 2018 01:05) (90% - 97%)    PHYSICAL EXAM:    GENERAL: frail thin elderly lady in NAD  HEAD:  Atraumatic, Normocephalic  EYES: EOMI, PERRLA, conjunctiva and sclera clear  NECK: Supple, No JVD, Normal thyroid  NERVOUS SYSTEM:  AAO x 3   CHEST/LUNG: decreased breath sounds left base  HEART: Regular rate and rhythm  ABDOMEN: Soft, Nontender,   EXTREMITIES:  no edema  LYMPH: no cervical adenopathy  SKIN: No rashes or lesions      LABS:                        11.0   25.6  )-----------( 259      ( 28 Aug 2018 06:02 )             35.2     08-28    132<L>  |  92<L>  |  19.0  ----------------------------<  124<H>  4.4   |  25.0  |  0.34<L>    Ca    8.6      28 Aug 2018 06:02  Phos  3.0     08-28  Mg     2.1     08-28              RADIOLOGY & ADDITIONAL STUDIES:  < from: NM PET/CT Onc FDG Skull to Thigh, Subsq (08.23.18 @ 11:16) >  IMPRESSION:      1.  FDG avid mass superior segment left lower lobe consistent with   malignancy, with involvement of the left posterior chest wall. New   pathologic compression fracture  T6 vertebral body with new destruction    left lamina and transverse process of T6 with FDG avidity extending into   the central spinal canal. Suggest further evaluation with T-spine MRI.     2.  Multiple new FDG avid pulmonary nodules and mediastinal   lymphadenopathy, consistent with metastatic disease.    3.  New bilateral FDG avid adrenal masses, bilobar hepatic lesions and   multiple peritoneal soft tissue nodules, suspicious for metastatic   disease.    4.  FDG avid soft tissue nodule deep to the right gluteal musculature,   suspicious for metastasis.    5.  Unchanged size of mildly FDG avid partially calcified anterior   mediastinal mass.    < end of copied text >

## 2018-08-30 LAB
APPEARANCE UR: CLEAR — SIGNIFICANT CHANGE UP
BILIRUB UR-MCNC: NEGATIVE — SIGNIFICANT CHANGE UP
COLOR SPEC: YELLOW — SIGNIFICANT CHANGE UP
DIFF PNL FLD: ABNORMAL
EPI CELLS # UR: ABNORMAL
GLUCOSE UR QL: NEGATIVE MG/DL — SIGNIFICANT CHANGE UP
KETONES UR-MCNC: NEGATIVE — SIGNIFICANT CHANGE UP
LEUKOCYTE ESTERASE UR-ACNC: NEGATIVE — SIGNIFICANT CHANGE UP
NITRITE UR-MCNC: NEGATIVE — SIGNIFICANT CHANGE UP
OSMOLALITY UR: 666 MOSM/KG — SIGNIFICANT CHANGE UP (ref 300–1000)
PH UR: 6 — SIGNIFICANT CHANGE UP (ref 5–8)
PROT UR-MCNC: 15 MG/DL
RBC CASTS # UR COMP ASSIST: SIGNIFICANT CHANGE UP /HPF (ref 0–4)
SODIUM UR-SCNC: 107 MMOL/L — SIGNIFICANT CHANGE UP
SP GR SPEC: 1.02 — SIGNIFICANT CHANGE UP (ref 1.01–1.02)
UROBILINOGEN FLD QL: NEGATIVE MG/DL — SIGNIFICANT CHANGE UP
WBC UR QL: SIGNIFICANT CHANGE UP

## 2018-08-30 PROCEDURE — 99233 SBSQ HOSP IP/OBS HIGH 50: CPT

## 2018-08-30 RX ORDER — VANCOMYCIN HCL 1 G
750 VIAL (EA) INTRAVENOUS EVERY 12 HOURS
Qty: 0 | Refills: 0 | Status: DISCONTINUED | OUTPATIENT
Start: 2018-08-30 | End: 2018-08-31

## 2018-08-30 RX ORDER — ALPRAZOLAM 0.25 MG
0.25 TABLET ORAL ONCE
Qty: 0 | Refills: 0 | Status: DISCONTINUED | OUTPATIENT
Start: 2018-08-30 | End: 2018-08-30

## 2018-08-30 RX ORDER — MORPHINE SULFATE 50 MG/1
2 CAPSULE, EXTENDED RELEASE ORAL ONCE
Qty: 0 | Refills: 0 | Status: DISCONTINUED | OUTPATIENT
Start: 2018-08-30 | End: 2018-08-30

## 2018-08-30 RX ADMIN — Medication 100 MILLIGRAM(S): at 05:51

## 2018-08-30 RX ADMIN — Medication 60 MILLIGRAM(S): at 11:42

## 2018-08-30 RX ADMIN — CEFEPIME 100 MILLIGRAM(S): 1 INJECTION, POWDER, FOR SOLUTION INTRAMUSCULAR; INTRAVENOUS at 11:42

## 2018-08-30 RX ADMIN — Medication 60 MILLIGRAM(S): at 23:54

## 2018-08-30 RX ADMIN — Medication 0.25 MILLIGRAM(S): at 05:51

## 2018-08-30 RX ADMIN — Medication 60 MILLIGRAM(S): at 05:52

## 2018-08-30 RX ADMIN — Medication 250 MILLIGRAM(S): at 18:41

## 2018-08-30 RX ADMIN — SODIUM CHLORIDE 75 MILLILITER(S): 9 INJECTION INTRAMUSCULAR; INTRAVENOUS; SUBCUTANEOUS at 04:00

## 2018-08-30 RX ADMIN — Medication 250 MILLIGRAM(S): at 18:40

## 2018-08-30 RX ADMIN — Medication 20 MILLIGRAM(S): at 05:51

## 2018-08-30 RX ADMIN — Medication 81 MILLIGRAM(S): at 11:41

## 2018-08-30 RX ADMIN — Medication 3 MILLILITER(S): at 15:16

## 2018-08-30 RX ADMIN — Medication 20 MILLIGRAM(S): at 18:41

## 2018-08-30 RX ADMIN — Medication 60 MILLIGRAM(S): at 18:41

## 2018-08-30 RX ADMIN — Medication 3 MILLILITER(S): at 21:20

## 2018-08-30 RX ADMIN — Medication 3 MILLILITER(S): at 00:53

## 2018-08-30 RX ADMIN — PANTOPRAZOLE SODIUM 40 MILLIGRAM(S): 20 TABLET, DELAYED RELEASE ORAL at 05:52

## 2018-08-30 RX ADMIN — Medication 30 MILLIGRAM(S): at 05:52

## 2018-08-30 RX ADMIN — Medication 3 MILLILITER(S): at 08:16

## 2018-08-30 RX ADMIN — SODIUM CHLORIDE 75 MILLILITER(S): 9 INJECTION INTRAMUSCULAR; INTRAVENOUS; SUBCUTANEOUS at 18:40

## 2018-08-30 RX ADMIN — CEFEPIME 100 MILLIGRAM(S): 1 INJECTION, POWDER, FOR SOLUTION INTRAMUSCULAR; INTRAVENOUS at 23:54

## 2018-08-30 RX ADMIN — ENOXAPARIN SODIUM 40 MILLIGRAM(S): 100 INJECTION SUBCUTANEOUS at 05:51

## 2018-08-30 RX ADMIN — MORPHINE SULFATE 2 MILLIGRAM(S): 50 CAPSULE, EXTENDED RELEASE ORAL at 12:43

## 2018-08-30 RX ADMIN — Medication 3 MILLILITER(S): at 04:10

## 2018-08-30 RX ADMIN — CEFEPIME 100 MILLIGRAM(S): 1 INJECTION, POWDER, FOR SOLUTION INTRAMUSCULAR; INTRAVENOUS at 01:25

## 2018-08-30 RX ADMIN — Medication 250 MILLIGRAM(S): at 05:52

## 2018-08-30 RX ADMIN — Medication 0.25 MILLIGRAM(S): at 18:41

## 2018-08-30 RX ADMIN — SIMVASTATIN 40 MILLIGRAM(S): 20 TABLET, FILM COATED ORAL at 22:25

## 2018-08-30 RX ADMIN — Medication 0.25 MILLIGRAM(S): at 12:43

## 2018-08-30 NOTE — PROGRESS NOTE ADULT - ASSESSMENT
Assessment and Plan:   · Assessment		  Patient is a 85 year old female with PMH of COPD, Lung CA (s/p radiation therapy), Anxiety, and HTN who presented to the ED with worsening shortness of breath since last night due to COPD exacerbation and progression of underlying lung tumor.    1. Acute hypoxic respiratory failure   -secondary to COPD exacerbation with likely underlying PNA and progression of Pancoast Tumor  -continue supplemental O2 and bronchodilators  -continue IV steroids  -continue broad spectrum abx given concern for right sided pneumonia and elevated procal  -CT chest with progression of lung tumor and invasion of the left chest wall    2. Metastatic NSCLC  -s/p palliative RT  -patient with severe left sided chest pain due to invasion of tumor into the chest wall   Fentanyl 12 mcg/hr patch may need to titrate to 25 mcg  -Recent PET CT showed widely metastatic disease with possible involvement of central spinal canal at T6.   -patient with  pain today and Dyspneic at rest.  -encouraged patient to accept medications and Nebulizer treatments Anxious and Agitated they are now agreeable to hospice services    3. Anxiety  Combination of Dyspnea and Pain Morphine 2mg IV for dyspnea stat and Q4 prn  Psychological Anxiety and Nocturnal Confusion  Xanax > to 3x/d  -Xanax PRN    4. Dysphagia  -SLP consulted  -continue pureed diet  supplement nutrition

## 2018-08-30 NOTE — GOALS OF CARE CONVERSATION - PERSONAL ADVANCE DIRECTIVE - NS PRO AD PATIENT TYPE
Medical Orders for Life-Sustaining Treatment (MOLST)/Do Not Resuscitate (DNR)
Medical Orders for Life-Sustaining Treatment (MOLST)/Do Not Resuscitate (DNR)

## 2018-08-30 NOTE — PROGRESS NOTE ADULT - SUBJECTIVE AND OBJECTIVE BOX
INTERVAL HPI/OVERNIGHT EVENTS: 86yo Female Patient admitted with severe SOB with known Lung Cancer.  At present patient is awake very anxious and trembling RR 28/min refused Nebulizer treatment when due a short while ago,  RT called asked to come back to administer treatment. Pulse oximetry ordered stat. Xanax 0.25mg ordered with Morphine 2mg IVP  for dyspnea and should begin to  help pain. Denies N/V/D CP Palpitations dizziness. Remains in bed frustrated and impatient  CC: SOB Lung Cancer with Disease Progression    85y old  Female who presents with a chief complaint of SOB (27 Aug 2018 19:54)    PAST MEDICAL & SURGICAL HISTORY:  Cataract  Risk factors for obstructive sleep apnea  Cancer: lung  COPD (chronic obstructive pulmonary disease)  Hypertension  History of cataract surgery  Anal fistula  S/P partial lobectomy of lung: Right      Present Symptoms:     Dyspnea:  3   Nausea/Vomiting:  No poor appetite  Anxiety:  Yes   Depression: Yes   Fatigue: Yes   Loss of appetite: Yes     Pain: Chest wall scapula back             Character-            Duration-            Effect-            Factors-            Frequency-            Location-            Severity-moderate to severe    Review of Systems: Reviewed                     All others negative    MEDICATIONS  (STANDING):  ALBUTerol/ipratropium for Nebulization 3 milliLiter(s) Nebulizer every 4 hours  ALPRAZolam 0.25 milliGRAM(s) Oral two times a day  ALPRAZolam 0.25 milliGRAM(s) Oral once  aspirin enteric coated 81 milliGRAM(s) Oral daily  cefepime   IVPB      cefepime   IVPB 1000 milliGRAM(s) IV Intermittent every 12 hours  enoxaparin Injectable 40 milliGRAM(s) SubCutaneous every 24 hours  fentaNYL   Patch  12 MICROgram(s)/Hr. 1 Patch Transdermal every 48 hours  FLUoxetine 20 milliGRAM(s) Oral two times a day  methylPREDNISolone sodium succinate Injectable 60 milliGRAM(s) IV Push every 6 hours  morphine  - Injectable 2 milliGRAM(s) IV Push once  NIFEdipine XL 30 milliGRAM(s) Oral daily  pantoprazole    Tablet 40 milliGRAM(s) Oral before breakfast  saccharomyces boulardii 250 milliGRAM(s) Oral two times a day  simvastatin 40 milliGRAM(s) Oral at bedtime  sodium chloride 0.9%. 1000 milliLiter(s) (75 mL/Hr) IV Continuous <Continuous>  vancomycin  IVPB 750 milliGRAM(s) IV Intermittent every 12 hours    MEDICATIONS  (PRN):  ALBUTerol    0.083% 2.5 milliGRAM(s) Nebulizer every 2 hours PRN Shortness of Breath and/or Wheezing  HYDROmorphone  Injectable 0.5 milliGRAM(s) IV Push every 3 hours PRN Severe Pain (7 - 10)  morphine  - Injectable 2 milliGRAM(s) IV Push every 6 hours PRN Severe Pain (7 - 10)  oxyCODONE    IR 5 milliGRAM(s) Oral every 4 hours PRN Moderate Pain (4 - 6)      PHYSICAL EXAM:    Vital Signs Last 24 Hrs  T(C): 36.8 (30 Aug 2018 11:39), Max: 36.8 (29 Aug 2018 17:26)  T(F): 98.3 (30 Aug 2018 11:39), Max: 98.3 (30 Aug 2018 11:39)  HR: 109 (30 Aug 2018 11:39) (90 - 109)  BP: 108/72 (30 Aug 2018 11:39) (108/72 - 125/65)  BP(mean): --  RR: 18 (30 Aug 2018 11:39) (18 - 20)  SpO2: 94% (30 Aug 2018 11:39) (94% - 98%)    General: alert  oriented x _3___  agitated                  cachexia      HEENT:dry mouth      Lungs:  tachypnea/labored breathing  wheezing    CV:  tachycardia RRR    GI: normal  non distended                 constipation  last BM: yesterday    :  incontinent      MSK:  weakness               ambulatory before admission  bedbound    Skin: normal    no rash    LABS:                        11.0   33.9  )-----------( 252      ( 29 Aug 2018 17:15 )             36.0         136  |  95<L>  |  16.0  ----------------------------<  143<H>  4.0   |  28.0  |  0.37<L>    Ca    8.6      29 Aug 2018 17:15  Phos  3.4       Mg     2.1         Urinalysis Basic - ( 30 Aug 2018 00:51 )    Color: Yellow / Appearance: Clear / S.025 / pH: x  Gluc: x / Ketone: Negative  / Bili: Negative / Urobili: Negative mg/dL   Blood: x / Protein: 15 mg/dL / Nitrite: Negative   Leuk Esterase: Negative / RBC: 0-2 /HPF / WBC 3-5   Sq Epi: x / Non Sq Epi: Many / Bacteria: x    I&O's Summary    RADIOLOGY & ADDITIONAL STUDIES:  < from: CT Angio Chest w/ IV Cont (18 @ 16:01) >  IMPRESSION:  No evidence of central pulmonary emboli.    Progression of disease with large left upper lobe Pancoast tumor with   destruction of the adjacent posterior medial chest wall, left posterior   upper rib and adjacent vertebrae with extension of disease into the left   neural foramen. Multiple bilateral metastatic nodules in the lungs.    Extensive new metastatic disease in the liver predominantly involving the   left lobe and bilateraladrenal metastasis.    < end of copied text >      ADVANCE DIRECTIVES:   DNR YES   Completed on:                     MOLST  YES    Completed on:  Living Will   NO   Completed on:

## 2018-08-30 NOTE — GOALS OF CARE CONVERSATION - PERSONAL ADVANCE DIRECTIVE - CONVERSATION DETAILS
call placed to daughter Shital left brief message , went  down to ed bedside . daughter present pt getting breathing treatment and  iv restarted . daughter sates she is very overwhelmed and anxious regarding pt change in condition . As per  daughter pt initially dx in 2012 with lung cancer  and had a lobectomy  no chemo or rt  required at that time  haad do well  as per daughter in jan 2018 had a recurrence  had a  recent petscan  had a biospy  and just completed 10 high  rT  and had appt [facility] at [facility] for possible  imunologicals . pt  had previously lived with her adult son who worked frequently so pt was primarily  alone . Nehemias recently came to stay with her daughter  for more support but daughter states she exhausted and requires more help . hospice program and servcies explained   [facility]  2-4 hr a day 3-5 days a week mon- fri based on availability  can be a week or more to place . encouraged to consider private hire for additional home support . daughter would like to speak to Dr ibarra to make an informed decision regarding pts care  before proceeding .  Discussed with dr Choudhary . hospice will remain available
plan is for pt to transition to home if able from the hospice inn, discussed with the family they are agreeable . pt has extremely  poor access requires  midline discussed with  dr barrios. pt pending available bed  once midline is placed  anticipate d/c  8/31

## 2018-08-30 NOTE — PROGRESS NOTE ADULT - SUBJECTIVE AND OBJECTIVE BOX
CHIEF COMPLAINT/INTERVAL HISTORY:    Patient is a 85y old  Female who presents with a chief complaint of SOB (27 Aug 2018 19:54)    SUBJECTIVE & OBJECTIVE: Pt seen and examined at bedside. No overnight events. Patient intermittent dyspneic, but does report feeling less discomfort today. Patient and family agreeable to inpatient hospice; will need midline prior to discharge.    ROS: No chest pain, nausea/vomiting, fevers/chills, abdominal pain, dysuria.    ICU Vital Signs Last 24 Hrs  T(C): 36.6 (30 Aug 2018 16:37), Max: 36.8 (29 Aug 2018 17:26)  T(F): 97.9 (30 Aug 2018 16:37), Max: 98.3 (30 Aug 2018 11:39)  HR: 112 (30 Aug 2018 16:37) (90 - 112)  BP: 125/65 (30 Aug 2018 16:37) (108/72 - 125/65)  RR: 18 (30 Aug 2018 16:37) (18 - 20)  SpO2: 95% (30 Aug 2018 16:37) (94% - 98%)    MEDICATIONS  (STANDING):  ALBUTerol/ipratropium for Nebulization 3 milliLiter(s) Nebulizer every 4 hours  ALPRAZolam 0.25 milliGRAM(s) Oral two times a day  aspirin enteric coated 81 milliGRAM(s) Oral daily  cefepime   IVPB      cefepime   IVPB 1000 milliGRAM(s) IV Intermittent every 12 hours  enoxaparin Injectable 40 milliGRAM(s) SubCutaneous every 24 hours  fentaNYL   Patch  12 MICROgram(s)/Hr. 1 Patch Transdermal every 48 hours  FLUoxetine 20 milliGRAM(s) Oral two times a day  methylPREDNISolone sodium succinate Injectable 60 milliGRAM(s) IV Push every 6 hours  NIFEdipine XL 30 milliGRAM(s) Oral daily  pantoprazole    Tablet 40 milliGRAM(s) Oral before breakfast  saccharomyces boulardii 250 milliGRAM(s) Oral two times a day  simvastatin 40 milliGRAM(s) Oral at bedtime  sodium chloride 0.9%. 1000 milliLiter(s) (75 mL/Hr) IV Continuous <Continuous>  vancomycin  IVPB 750 milliGRAM(s) IV Intermittent every 12 hours    MEDICATIONS  (PRN):  ALBUTerol    0.083% 2.5 milliGRAM(s) Nebulizer every 2 hours PRN Shortness of Breath and/or Wheezing  HYDROmorphone  Injectable 0.5 milliGRAM(s) IV Push every 3 hours PRN Severe Pain (7 - 10)  morphine  - Injectable 2 milliGRAM(s) IV Push every 6 hours PRN Severe Pain (7 - 10)  oxyCODONE    IR 5 milliGRAM(s) Oral every 4 hours PRN Moderate Pain (4 - 6)      LABS:                        11.0   33.9  )-----------( 252      ( 29 Aug 2018 17:15 )             36.0     08-    136  |  95<L>  |  16.0  ----------------------------<  143<H>  4.0   |  28.0  |  0.37<L>    Ca    8.6      29 Aug 2018 17:15  Phos  3.4       Mg     2.1             Urinalysis Basic - ( 30 Aug 2018 00:51 )    Color: Yellow / Appearance: Clear / S.025 / pH: x  Gluc: x / Ketone: Negative  / Bili: Negative / Urobili: Negative mg/dL   Blood: x / Protein: 15 mg/dL / Nitrite: Negative   Leuk Esterase: Negative / RBC: 0-2 /HPF / WBC 3-5   Sq Epi: x / Non Sq Epi: Many / Bacteria: x      PHYSICAL EXAM:    GENERAL: elderly, frail, cachetic female   HEAD:  Atraumatic, Normocephalic  EYES: EOMI, PERRLA, conjunctiva and sclera clear  ENMT: Moist mucous membranes  NECK: Supple   NERVOUS SYSTEM:  Alert & Oriented X3   CHEST/LUNG: + expiratory wheezing  HEART: Regular rate and rhythm; + S1/S2  ABDOMEN: Soft, Nontender, Nondistended; Bowel sounds present  EXTREMITIES:  no pedal edema

## 2018-08-30 NOTE — GOALS OF CARE CONVERSATION - PERSONAL ADVANCE DIRECTIVE - NS PRO AD PATIENT TYPE ON CHART
Medical Orders for Life-Sustaining Treatment (MOLST)/Do Not Resuscitate (DNR)
Do Not Resuscitate (DNR)/Medical Orders for Life-Sustaining Treatment (MOLST)

## 2018-08-30 NOTE — PROCEDURE NOTE - NSPROCDETAILS_GEN_ALL_CORE
supine position/sterile technique, catheter placed/location identified, draped/prepped, sterile technique used/ultrasound guidance/ultrasound assessment/sterile dressing applied/Trendelenburg position

## 2018-08-30 NOTE — PROGRESS NOTE ADULT - ASSESSMENT
Patient is a 85 year old female with PMH of COPD, Lung CA (s/p radiation therapy), Anxiety, and HTN who presented to the ED with worsening shortness of breath since last night due to COPD exacerbation and progression of underlying lung tumor.    1. Acute hypoxic respiratory failure   -secondary to COPD exacerbation with likely underlying PNA and progression of Pancoast Tumor  -continue supplemental O2 and bronchodilators  -continue IV steroids  -continue broad spectrum abx given concern for right sided pneumonia and elevated procal  -nocturnal and PRN bipap  -CT chest with progression of lung tumor and invasion of the left chest wall    2. Metastatic NSCLC  -s/p palliative RT  -patient with severe left sided chest pain due to invasion of tumor into the chest wall  -Recent PET CT showed widely metastatic disease with possible involvement of central spinal canal at T6.   -patient's pain better controlled today; continue analgesia as prescribed  -palliative care and hospice consults appreciated  -patient and daughter met with Dr. Mayberry yesterday and were informed there would be limited benefit of immunotherapy and they are agreeable to hospice services  -hospice team on board; patient will need midline prior to discharge to hospice inn (no beds available today)    3. Anxiety  -Xanax PRN    4. HTN  -continue home medications    5. Hypovolemic Hyponatremia - resolved  -likely due to low solute intake and hypovolemia  -no further lab draws    6. Dysphagia  -SLP consulted  -continue pureed diet     DVT prophylaxis - heparin SC    Attending Attestation:   Plan discussed with patient, hospice team, RN

## 2018-08-31 ENCOUNTER — OUTPATIENT (OUTPATIENT)
Dept: OUTPATIENT SERVICES | Facility: HOSPITAL | Age: 83
LOS: 1 days | Discharge: HOME | End: 2018-08-31

## 2018-08-31 DIAGNOSIS — Z98.49 CATARACT EXTRACTION STATUS, UNSPECIFIED EYE: Chronic | ICD-10-CM

## 2018-08-31 DIAGNOSIS — K60.3 ANAL FISTULA: Chronic | ICD-10-CM

## 2018-08-31 DIAGNOSIS — Z90.2 ACQUIRED ABSENCE OF LUNG [PART OF]: Chronic | ICD-10-CM

## 2018-08-31 DIAGNOSIS — C34.90 MALIGNANT NEOPLASM OF UNSPECIFIED PART OF UNSPECIFIED BRONCHUS OR LUNG: ICD-10-CM

## 2018-08-31 LAB — VANCOMYCIN TROUGH SERPL-MCNC: 39.4 UG/ML — CRITICAL HIGH (ref 10–20)

## 2018-08-31 PROCEDURE — 99498 ADVNCD CARE PLAN ADDL 30 MIN: CPT

## 2018-08-31 PROCEDURE — 99497 ADVNCD CARE PLAN 30 MIN: CPT

## 2018-08-31 PROCEDURE — 99233 SBSQ HOSP IP/OBS HIGH 50: CPT

## 2018-08-31 RX ORDER — ALPRAZOLAM 0.25 MG
0.5 TABLET ORAL ONCE
Qty: 0 | Refills: 0 | Status: DISCONTINUED | OUTPATIENT
Start: 2018-08-31 | End: 2018-08-31

## 2018-08-31 RX ORDER — ALPRAZOLAM 0.25 MG
0.5 TABLET ORAL EVERY 8 HOURS
Qty: 0 | Refills: 0 | Status: DISCONTINUED | OUTPATIENT
Start: 2018-08-31 | End: 2018-09-02

## 2018-08-31 RX ADMIN — MORPHINE SULFATE 2 MILLIGRAM(S): 50 CAPSULE, EXTENDED RELEASE ORAL at 11:29

## 2018-08-31 RX ADMIN — Medication 250 MILLIGRAM(S): at 05:39

## 2018-08-31 RX ADMIN — Medication 40 MILLIGRAM(S): at 13:50

## 2018-08-31 RX ADMIN — HYDROMORPHONE HYDROCHLORIDE 0.5 MILLIGRAM(S): 2 INJECTION INTRAMUSCULAR; INTRAVENOUS; SUBCUTANEOUS at 14:33

## 2018-08-31 RX ADMIN — MORPHINE SULFATE 2 MILLIGRAM(S): 50 CAPSULE, EXTENDED RELEASE ORAL at 19:29

## 2018-08-31 RX ADMIN — Medication 3 MILLILITER(S): at 01:07

## 2018-08-31 RX ADMIN — Medication 250 MILLIGRAM(S): at 05:40

## 2018-08-31 RX ADMIN — MORPHINE SULFATE 2 MILLIGRAM(S): 50 CAPSULE, EXTENDED RELEASE ORAL at 12:00

## 2018-08-31 RX ADMIN — SIMVASTATIN 40 MILLIGRAM(S): 20 TABLET, FILM COATED ORAL at 21:37

## 2018-08-31 RX ADMIN — Medication 250 MILLIGRAM(S): at 17:00

## 2018-08-31 RX ADMIN — HYDROMORPHONE HYDROCHLORIDE 0.5 MILLIGRAM(S): 2 INJECTION INTRAMUSCULAR; INTRAVENOUS; SUBCUTANEOUS at 15:00

## 2018-08-31 RX ADMIN — ENOXAPARIN SODIUM 40 MILLIGRAM(S): 100 INJECTION SUBCUTANEOUS at 05:41

## 2018-08-31 RX ADMIN — Medication 20 MILLIGRAM(S): at 17:00

## 2018-08-31 RX ADMIN — Medication 3 MILLILITER(S): at 20:17

## 2018-08-31 RX ADMIN — PANTOPRAZOLE SODIUM 40 MILLIGRAM(S): 20 TABLET, DELAYED RELEASE ORAL at 06:07

## 2018-08-31 RX ADMIN — MORPHINE SULFATE 2 MILLIGRAM(S): 50 CAPSULE, EXTENDED RELEASE ORAL at 18:59

## 2018-08-31 RX ADMIN — FENTANYL CITRATE 1 PATCH: 50 INJECTION INTRAVENOUS at 21:37

## 2018-08-31 RX ADMIN — Medication 3 MILLILITER(S): at 08:39

## 2018-08-31 RX ADMIN — Medication 0.5 MILLIGRAM(S): at 11:35

## 2018-08-31 RX ADMIN — Medication 3 MILLILITER(S): at 15:53

## 2018-08-31 RX ADMIN — HYDROMORPHONE HYDROCHLORIDE 0.5 MILLIGRAM(S): 2 INJECTION INTRAMUSCULAR; INTRAVENOUS; SUBCUTANEOUS at 19:54

## 2018-08-31 RX ADMIN — FENTANYL CITRATE 1 PATCH: 50 INJECTION INTRAVENOUS at 21:35

## 2018-08-31 RX ADMIN — Medication 60 MILLIGRAM(S): at 05:40

## 2018-08-31 RX ADMIN — Medication 40 MILLIGRAM(S): at 21:37

## 2018-08-31 RX ADMIN — CEFEPIME 100 MILLIGRAM(S): 1 INJECTION, POWDER, FOR SOLUTION INTRAMUSCULAR; INTRAVENOUS at 11:36

## 2018-08-31 RX ADMIN — Medication 20 MILLIGRAM(S): at 05:41

## 2018-08-31 RX ADMIN — HYDROMORPHONE HYDROCHLORIDE 0.5 MILLIGRAM(S): 2 INJECTION INTRAMUSCULAR; INTRAVENOUS; SUBCUTANEOUS at 20:10

## 2018-08-31 RX ADMIN — Medication 3 MILLILITER(S): at 03:57

## 2018-08-31 RX ADMIN — Medication 81 MILLIGRAM(S): at 11:36

## 2018-08-31 RX ADMIN — Medication 30 MILLIGRAM(S): at 05:40

## 2018-08-31 RX ADMIN — Medication 3 MILLILITER(S): at 12:10

## 2018-08-31 RX ADMIN — Medication 0.5 MILLIGRAM(S): at 21:37

## 2018-08-31 NOTE — PROGRESS NOTE ADULT - SUBJECTIVE AND OBJECTIVE BOX
CHIEF COMPLAINT/INTERVAL HISTORY:    Patient is a 85y old  Female who presents with a chief complaint of SOB (27 Aug 2018 19:54)    SUBJECTIVE & OBJECTIVE: Pt seen and examined at bedside. No overnight events. Patient more anxious today; xanax increased. Initially, patient more comfortable but later complaining of pain requiring breakthrough medication. Discussed hospice care with daughter again; now agreeable to [facility].    ROS: Denies nausea/vomiting, abdominal pain, diarrhea, dizziness, fever or chills.    ICU Vital Signs Last 24 Hrs  T(C): 36.7 (31 Aug 2018 11:23), Max: 36.7 (31 Aug 2018 11:23)  T(F): 98 (31 Aug 2018 11:23), Max: 98 (31 Aug 2018 11:23)  HR: 110 (31 Aug 2018 11:23) (90 - 112)  BP: 122/64 (31 Aug 2018 11:23) (122/64 - 149/87)  RR: 20 (31 Aug 2018 11:23) (18 - 20)  SpO2: 94% (31 Aug 2018 15:55) (94% - 99%)    MEDICATIONS  (STANDING):  ALBUTerol/ipratropium for Nebulization 3 milliLiter(s) Nebulizer every 4 hours  ALPRAZolam 0.5 milliGRAM(s) Oral every 8 hours  aspirin enteric coated 81 milliGRAM(s) Oral daily  cefepime   IVPB      cefepime   IVPB 1000 milliGRAM(s) IV Intermittent every 12 hours  enoxaparin Injectable 40 milliGRAM(s) SubCutaneous every 24 hours  fentaNYL   Patch  12 MICROgram(s)/Hr. 1 Patch Transdermal every 48 hours  FLUoxetine 20 milliGRAM(s) Oral two times a day  methylPREDNISolone sodium succinate Injectable 40 milliGRAM(s) IV Push every 8 hours  NIFEdipine XL 30 milliGRAM(s) Oral daily  pantoprazole    Tablet 40 milliGRAM(s) Oral before breakfast  saccharomyces boulardii 250 milliGRAM(s) Oral two times a day  simvastatin 40 milliGRAM(s) Oral at bedtime    MEDICATIONS  (PRN):  ALBUTerol    0.083% 2.5 milliGRAM(s) Nebulizer every 2 hours PRN Shortness of Breath and/or Wheezing  HYDROmorphone  Injectable 0.5 milliGRAM(s) IV Push every 3 hours PRN Severe Pain (7 - 10)  morphine  - Injectable 2 milliGRAM(s) IV Push every 6 hours PRN Severe Pain (7 - 10)  oxyCODONE    IR 5 milliGRAM(s) Oral every 4 hours PRN Moderate Pain (4 - 6)      LABS:                        11.0   33.9  )-----------( 252      ( 29 Aug 2018 17:15 )             36.0     08-    136  |  95<L>  |  16.0  ----------------------------<  143<H>  4.0   |  28.0  |  0.37<L>    Ca    8.6      29 Aug 2018 17:15  Phos  3.4     -  Mg     2.1             Urinalysis Basic - ( 30 Aug 2018 00:51 )    Color: Yellow / Appearance: Clear / S.025 / pH: x  Gluc: x / Ketone: Negative  / Bili: Negative / Urobili: Negative mg/dL   Blood: x / Protein: 15 mg/dL / Nitrite: Negative   Leuk Esterase: Negative / RBC: 0-2 /HPF / WBC 3-5   Sq Epi: x / Non Sq Epi: Many / Bacteria: x      PHYSICAL EXAM:    GENERAL: elderly, frail female, laying in bed in painful distress  HEAD:  Atraumatic, Normocephalic  EYES: EOMI, PERRLA, conjunctiva and sclera clear  ENMT: Moist mucous membranes  NECK: Supple, No JVD  NERVOUS SYSTEM:  Alert & Oriented X3   CHEST/LUNG: + wheezing   HEART: Regular rate and rhythm; + S1/S2  ABDOMEN: Soft, Nontender, Nondistended   EXTREMITIES:  no pedal edema

## 2018-08-31 NOTE — PROGRESS NOTE ADULT - SUBJECTIVE AND OBJECTIVE BOX
HPI: This is an 86yo F with known PMH of COPD and Metastatic Lung Cancer S/P RT treatments-with disease progression and CW pain. Admitted with increasing SOB, weakness. She progressed to Acute Respiratory Failure needing BIPAP support. She is cachectic and very anxious which spirals into worsening SOB. Sitting up in bed at 90 degrees. Not able to tolerate any activity. She is trembling and agitated at times. Son at bedside, daughter was called earlier this morning to inform her Hospice Bed was available and she would be transferred there shortly. Daughter became upset and angry, stating she did not expect this, and does not agree with her transfer to [facility]ospice [facility].  Denies any other symptoms at this time  After much discussion, she did agree to allow transfer to [facility] if and when another bed should become available this weekend.   CC: Severe SOB "I can't breathe" Advanced Lung Cancer with Metastasis to bone and liver.    Patient is a 85 year old female with PMH of COPD, Lung CA (s/p radiation therapy), Anxiety, and HTN who presented to the ED with worsening shortness of breath since last night. Patient is a poor historian, therefore most information obtained from chart review. Per EMS, patient was hypoxic at home and then placed on cpap support with some improvement. In the ED, patient was given 2 nebulizer treatments with improvement in chest tightness. CT chest negative for PE with progression of disease and destruction of left chest wall and evidence of liver mets. Patient initially in severe left sided chest wall pain and anxious. Pain significantly improved with IV dilaudid. Patient requesting to be DNR/DNI and interested in comfort measures only. Hospice and palliative care consults placed. Currently, patient denies chest pain, lightheadedness, dizziness, nausea/vomiting, abdominal pain, diarrhea, fevers or chills. Labs significant for leukocytosis but patient on chronic steroids at home. Patient does report an intermittent episodes of a productive cough. (27 Aug 2018 19:54)      PERTINENT PMH REVIEWED: Yes     PAST MEDICAL & SURGICAL HISTORY:  Cataract  Risk factors for obstructive sleep apnea  Cancer: lung  COPD (chronic obstructive pulmonary disease)  Hypertension  History of cataract surgery  Anal fistula  S/P partial lobectomy of lung: Right      SOCIAL HISTORY:  EtOH   No                                    Drugs    No                                 nonsmoker                                    Admitted from: home Lives     FAMILY HISTORY:  No pertinent family history in first degree relatives      Allergies    No Known Allergies    Intolerances        Baseline ADLs (prior to admission):  Independent/ Dependent      Present Symptoms:     Dyspnea: 0 1 2 3   Nausea/Vomiting: Yes No  Anxiety:  Yes No  Depression: Yes No  Fatigue: Yes No  Loss of appetite: Yes No    Pain:             Character-            Duration-            Effect-            Factors-            Frequency-            Location-            Severity-    Review of Systems: Reviewed                     Negative:                     Positive:  Unable to obtain due to poor mentation   All others negative    MEDICATIONS  (STANDING):  ALBUTerol/ipratropium for Nebulization 3 milliLiter(s) Nebulizer every 4 hours  ALPRAZolam 0.5 milliGRAM(s) Oral every 8 hours  aspirin enteric coated 81 milliGRAM(s) Oral daily  cefepime   IVPB      cefepime   IVPB 1000 milliGRAM(s) IV Intermittent every 12 hours  enoxaparin Injectable 40 milliGRAM(s) SubCutaneous every 24 hours  fentaNYL   Patch  12 MICROgram(s)/Hr. 1 Patch Transdermal every 48 hours  FLUoxetine 20 milliGRAM(s) Oral two times a day  methylPREDNISolone sodium succinate Injectable 40 milliGRAM(s) IV Push every 8 hours  NIFEdipine XL 30 milliGRAM(s) Oral daily  pantoprazole    Tablet 40 milliGRAM(s) Oral before breakfast  saccharomyces boulardii 250 milliGRAM(s) Oral two times a day  simvastatin 40 milliGRAM(s) Oral at bedtime    MEDICATIONS  (PRN):  ALBUTerol    0.083% 2.5 milliGRAM(s) Nebulizer every 2 hours PRN Shortness of Breath and/or Wheezing  HYDROmorphone  Injectable 0.5 milliGRAM(s) IV Push every 3 hours PRN Severe Pain (7 - 10)  morphine  - Injectable 2 milliGRAM(s) IV Push every 6 hours PRN Severe Pain (7 - 10)  oxyCODONE    IR 5 milliGRAM(s) Oral every 4 hours PRN Moderate Pain (4 - 6)      PHYSICAL EXAM:    Vital Signs Last 24 Hrs  T(C): 36.7 (31 Aug 2018 11:23), Max: 36.7 (31 Aug 2018 11:23)  T(F): 98 (31 Aug 2018 11:23), Max: 98 (31 Aug 2018 11:23)  HR: 110 (31 Aug 2018 11:23) (90 - 111)  BP: 122/64 (31 Aug 2018 11:23) (122/64 - 149/87)  BP(mean): --  RR: 20 (31 Aug 2018 11:23) (20 - 20)  SpO2: 94% (31 Aug 2018 15:55) (94% - 99%)    General: alert  oriented x ____ lethargic agitated                  cachexia  nonverbal  coma    Karnofsky:  %    HEENT: normal  dry mouth  ET tube/trach    Lungs: comfortable tachypnea/labored breathing  excessive secretions    CV: normal  tachycardia    GI: normal  distended  tender  no BS               PEG/NG/OG tube  constipation  last BM:     : normal  incontinent  oliguria/anuria  esquivel    MSK: normal  weakness  edema             ambulatory  bedbound/wheelchair bound    Skin: normal  pressure ulcers- Stage_____  no rash    LABS:                        11.0   33.9  )-----------( 252      ( 29 Aug 2018 17:15 )             36.0         136  |  95<L>  |  16.0  ----------------------------<  143<H>  4.0   |  28.0  |  0.37<L>    Ca    8.6      29 Aug 2018 17:15  Phos  3.4       Mg     2.1             Urinalysis Basic - ( 30 Aug 2018 00:51 )    Color: Yellow / Appearance: Clear / S.025 / pH: x  Gluc: x / Ketone: Negative  / Bili: Negative / Urobili: Negative mg/dL   Blood: x / Protein: 15 mg/dL / Nitrite: Negative   Leuk Esterase: Negative / RBC: 0-2 /HPF / WBC 3-5   Sq Epi: x / Non Sq Epi: Many / Bacteria: x      I&O's Summary      RADIOLOGY & ADDITIONAL STUDIES:    ADVANCE DIRECTIVES:   DNR YES NO  Completed on:                     MOLST  YES NO   Completed on:  Living Will  YES NO   Completed on:      COUNSELING:    Face to face meeting to discuss Advanced Care Planning - Time Spent ______ Minutes.  See goals of care note.    More than 50% time spent in counseling and coordinating care. ______ Minutes.     Thank you for the opportunity to assist with the care of this patient.   [facility] [phone].

## 2018-08-31 NOTE — PROGRESS NOTE ADULT - ASSESSMENT
Patient is a 85 year old female with PMH of COPD, Lung CA (s/p radiation therapy), Anxiety, and HTN who presented to the ED with worsening shortness of breath since last night due to COPD exacerbation and progression of underlying lung tumor. Patient is now DNR/DNI with comfort measures only; awaiting bed at hospice [facility].    1. Acute hypoxic respiratory failure   -secondary to COPD exacerbation with likely underlying PNA and progression of Pancoast Tumor  -continue supplemental O2 and bronchodilators  -continue IV steroids; taper to 40 mg q8hrs today  -continue broad spectrum abx given concern for right sided pneumonia and elevated procal  -nocturnal and PRN bipap  -CT chest with progression of lung tumor and invasion of the left chest wall    2. Metastatic NSCLC  -s/p palliative RT  -patient with severe left sided chest pain due to invasion of tumor into the chest wall  -Recent PET CT showed widely metastatic disease with possible involvement of central spinal canal at T6.   -continue analgesia as prescribed  -palliative care and hospice consults appreciated  -patient and daughter met with Dr. Mayberry on 8/29 and were informed there would be limited benefit of immunotherapy and they are agreeable to hospice services  -hospice team on board; midline placed  -discussed hospice care and inpatient hospice with patient's daughter again today who is agreeable; no beds available today    3. Anxiety  -Xanax increased    4. HTN  -continue home medications    5. Hypovolemic Hyponatremia - resolved  -likely due to low solute intake and hypovolemia  -no further lab draws    6. Dysphagia  -SLP consulted  -continue pureed diet   -ensure TID    DVT prophylaxis - heparin SC

## 2018-09-01 PROCEDURE — 99232 SBSQ HOSP IP/OBS MODERATE 35: CPT

## 2018-09-01 RX ADMIN — Medication 0.5 MILLIGRAM(S): at 06:37

## 2018-09-01 RX ADMIN — Medication 0.5 MILLIGRAM(S): at 13:38

## 2018-09-01 RX ADMIN — CEFEPIME 100 MILLIGRAM(S): 1 INJECTION, POWDER, FOR SOLUTION INTRAMUSCULAR; INTRAVENOUS at 00:59

## 2018-09-01 RX ADMIN — Medication 3 MILLILITER(S): at 12:37

## 2018-09-01 RX ADMIN — Medication 3 MILLILITER(S): at 15:44

## 2018-09-01 RX ADMIN — Medication 81 MILLIGRAM(S): at 11:57

## 2018-09-01 RX ADMIN — CEFEPIME 100 MILLIGRAM(S): 1 INJECTION, POWDER, FOR SOLUTION INTRAMUSCULAR; INTRAVENOUS at 11:57

## 2018-09-01 RX ADMIN — Medication 0.5 MILLIGRAM(S): at 21:35

## 2018-09-01 RX ADMIN — Medication 3 MILLILITER(S): at 03:52

## 2018-09-01 RX ADMIN — MORPHINE SULFATE 2 MILLIGRAM(S): 50 CAPSULE, EXTENDED RELEASE ORAL at 01:02

## 2018-09-01 RX ADMIN — Medication 3 MILLILITER(S): at 19:46

## 2018-09-01 RX ADMIN — HYDROMORPHONE HYDROCHLORIDE 0.5 MILLIGRAM(S): 2 INJECTION INTRAMUSCULAR; INTRAVENOUS; SUBCUTANEOUS at 07:00

## 2018-09-01 RX ADMIN — SIMVASTATIN 40 MILLIGRAM(S): 20 TABLET, FILM COATED ORAL at 21:35

## 2018-09-01 RX ADMIN — HYDROMORPHONE HYDROCHLORIDE 0.5 MILLIGRAM(S): 2 INJECTION INTRAMUSCULAR; INTRAVENOUS; SUBCUTANEOUS at 13:39

## 2018-09-01 RX ADMIN — Medication 30 MILLIGRAM(S): at 06:37

## 2018-09-01 RX ADMIN — Medication 250 MILLIGRAM(S): at 06:37

## 2018-09-01 RX ADMIN — HYDROMORPHONE HYDROCHLORIDE 0.5 MILLIGRAM(S): 2 INJECTION INTRAMUSCULAR; INTRAVENOUS; SUBCUTANEOUS at 06:31

## 2018-09-01 RX ADMIN — Medication 3 MILLILITER(S): at 00:29

## 2018-09-01 RX ADMIN — Medication 250 MILLIGRAM(S): at 19:07

## 2018-09-01 RX ADMIN — OXYCODONE HYDROCHLORIDE 5 MILLIGRAM(S): 5 TABLET ORAL at 12:50

## 2018-09-01 RX ADMIN — ENOXAPARIN SODIUM 40 MILLIGRAM(S): 100 INJECTION SUBCUTANEOUS at 11:58

## 2018-09-01 RX ADMIN — Medication 40 MILLIGRAM(S): at 19:07

## 2018-09-01 RX ADMIN — Medication 20 MILLIGRAM(S): at 19:06

## 2018-09-01 RX ADMIN — Medication 3 MILLILITER(S): at 08:01

## 2018-09-01 RX ADMIN — PANTOPRAZOLE SODIUM 40 MILLIGRAM(S): 20 TABLET, DELAYED RELEASE ORAL at 06:37

## 2018-09-01 RX ADMIN — Medication 40 MILLIGRAM(S): at 06:33

## 2018-09-01 RX ADMIN — Medication 20 MILLIGRAM(S): at 06:37

## 2018-09-01 RX ADMIN — OXYCODONE HYDROCHLORIDE 5 MILLIGRAM(S): 5 TABLET ORAL at 11:55

## 2018-09-01 RX ADMIN — MORPHINE SULFATE 2 MILLIGRAM(S): 50 CAPSULE, EXTENDED RELEASE ORAL at 01:13

## 2018-09-01 RX ADMIN — HYDROMORPHONE HYDROCHLORIDE 0.5 MILLIGRAM(S): 2 INJECTION INTRAMUSCULAR; INTRAVENOUS; SUBCUTANEOUS at 14:20

## 2018-09-01 RX ADMIN — Medication 3 MILLILITER(S): at 23:05

## 2018-09-01 NOTE — PROGRESS NOTE ADULT - ASSESSMENT
Patient is a 85 year old female with PMH of COPD, Lung CA (s/p radiation therapy), Anxiety, and HTN who presented to the ED with worsening shortness of breath since last night due to COPD exacerbation and progression of underlying lung tumor. Patient is now DNR/DNI with comfort measures only; awaiting bed at hospice [facility].    > Acute hypoxic respiratory failure   -secondary to COPD exacerbation with likely underlying PNA and progression of Pancoast Tumor  -continue supplemental O2 and bronchodilators  -continue IV steroids taper   -continue broad spectrum abx given concern for right sided pneumonia and elevated procal  -nocturnal and PRN bipap  -CT chest with progression of lung tumor and invasion of the left chest wall    >Metastatic NSCLC  -s/p palliative RT  -patient with severe left sided chest pain due to invasion of tumor into the chest wall  -Recent PET CT showed widely metastatic disease with possible involvement of central spinal canal at T6.   -continue analgesia as prescribed  -palliative care and hospice consults appreciated  -patient and daughter met with Dr. Mayberry on 8/29 and were informed there would be limited benefit of immunotherapy and they are agreeable to hospice services  -hospice team on board; midline placed  -discussed with patient's son at bedside whos agreeable to hospice     >Anxiety  -c/w Xanax    >HTN  -continue home medications    >Dysphagia  -SLP consulted  -continue pureed diet   -ensure TID    DVT prophylaxis - heparin SC

## 2018-09-01 NOTE — PROGRESS NOTE ADULT - ATTENDING COMMENTS
Lengthy discussion held with patient's daughter Shital regarding hospice services. Daughter very emotional, states that even though she had spoken to the hospice RN yesterday that she didn't realize her mom was going to inpatient hospice. Daughter understands that the patient would require 24 hrs of care if she was to go home and home hospice does not provide 24 hour care. Daughter explained that the current plan is to focus on patient's symptoms (pain, anxiety and SOB) and to make her more comfortable. Informed that inpatient hospice would be the best option for her and she might be able to transition to home hospice in the future. Encouraged daughter to discuss these options with the hospice team. Daughter very emotional, but agreed that inpatient hospice would be the best option for her mother. No beds available today. Midline placed. All questions answered and emotional support provided. Time spent on GOC conversation 45 minutes.
COUNSELING:    Face to face meeting to discuss Advanced Care Planning - Time Spent ______ Minutes.  See goals of care note.    More than 50% time spent in counseling and coordinating care. _45_____ Minutes.     Thank you for the opportunity to assist with the care of this patient.   [facility] [phone].
Lengthy discussion held with patient's daughter Shital regarding hospice services. Daughter very emotional, states that even though she had spoken to the hospice RN yesterday that she didn't realize her mom was going to inpatient hospice. Daughter understands that the patient would require 24 hrs of care if she was to go home and home hospice does not provide 24 hour care. Daughter explained that the current plan is to focus on patient's symptoms (pain, anxiety and SOB) and to make her more comfortable. Informed that inpatient hospice would be the best option for her and she might be able to transition to home hospice in the future. Encouraged daughter to discuss these options with the hospice team. Daughter very emotional, but agreed that inpatient hospice would be the best option for her mother. No beds available today. Midline placed. All questions answered and emotional support provided. Time spent on GOC conversation 45 minutes.

## 2018-09-01 NOTE — PROGRESS NOTE ADULT - SUBJECTIVE AND OBJECTIVE BOX
CHIEF COMPLAINT/INTERVAL HISTORY:    Patient is a 85y old  Female who presents with a chief complaint of SOB (27 Aug 2018 19:54)    SUBJECTIVE & OBJECTIVE: Pt seen and examined at bedside. No overnight events. Patient comfotable. son and at bedside.   ROS: Denies nausea/vomiting, abdominal pain, diarrhea, dizziness, fever or chills.    Vital Signs Last 24 Hrs  T(C): 36.6 (01 Sep 2018 15:48), Max: 36.6 (31 Aug 2018 21:39)  T(F): 97.9 (01 Sep 2018 15:48), Max: 97.9 (01 Sep 2018 15:48)  HR: 105 (01 Sep 2018 15:48) (88 - 110)  BP: 148/76 (01 Sep 2018 15:48) (95/64 - 148/76)  BP(mean): 80 (01 Sep 2018 01:05) (68 - 80)  RR: 18 (01 Sep 2018 15:48) (16 - 21)  SpO2: 93% (01 Sep 2018 15:44) (93% - 98%)    PHYSICAL EXAM:    GENERAL: elderly, frail female, laying in bed , comfortable   HEAD:  Atraumatic, Normocephalic  EYES: EOMI, PERRLA, conjunctiva and sclera clear  ENMT: Moist mucous membranes  NECK: Supple, No JVD  NERVOUS SYSTEM:  Alert & Oriented X3   CHEST/LUNG: + wheezing   HEART: Regular rate and rhythm; + S1/S2  ABDOMEN: Soft, Nontender, Nondistended   EXTREMITIES:  no pedal edema      meds reviewed

## 2018-09-01 NOTE — PROGRESS NOTE ADULT - NSHPATTENDINGPLANDISCUSS_GEN_ALL_CORE
patient, son and daughter at bedside, hospice team, palliative care team, RN
patient, daughter at bedside, hospice team, RN, Dr. Mayberry
patient, daughter, hospice & palliative teams, Dr. Mayberry, RN
patient, son and daughter at bedside, hospice team, palliative care team, RN

## 2018-09-02 PROCEDURE — 99232 SBSQ HOSP IP/OBS MODERATE 35: CPT

## 2018-09-02 RX ORDER — HYDROMORPHONE HYDROCHLORIDE 2 MG/ML
1 INJECTION INTRAMUSCULAR; INTRAVENOUS; SUBCUTANEOUS
Qty: 0 | Refills: 0 | Status: DISCONTINUED | OUTPATIENT
Start: 2018-09-02 | End: 2018-09-03

## 2018-09-02 RX ORDER — ALPRAZOLAM 0.25 MG
0.5 TABLET ORAL THREE TIMES A DAY
Qty: 0 | Refills: 0 | Status: DISCONTINUED | OUTPATIENT
Start: 2018-09-02 | End: 2018-09-03

## 2018-09-02 RX ORDER — ALPRAZOLAM 0.25 MG
0.5 TABLET ORAL EVERY 6 HOURS
Qty: 0 | Refills: 0 | Status: DISCONTINUED | OUTPATIENT
Start: 2018-09-02 | End: 2018-09-02

## 2018-09-02 RX ORDER — OXYCODONE HYDROCHLORIDE 5 MG/1
10 TABLET ORAL EVERY 4 HOURS
Qty: 0 | Refills: 0 | Status: DISCONTINUED | OUTPATIENT
Start: 2018-09-02 | End: 2018-09-03

## 2018-09-02 RX ADMIN — OXYCODONE HYDROCHLORIDE 5 MILLIGRAM(S): 5 TABLET ORAL at 01:07

## 2018-09-02 RX ADMIN — Medication 250 MILLIGRAM(S): at 06:26

## 2018-09-02 RX ADMIN — CEFEPIME 100 MILLIGRAM(S): 1 INJECTION, POWDER, FOR SOLUTION INTRAMUSCULAR; INTRAVENOUS at 23:14

## 2018-09-02 RX ADMIN — Medication 3 MILLILITER(S): at 12:16

## 2018-09-02 RX ADMIN — Medication 0.5 MILLIGRAM(S): at 20:56

## 2018-09-02 RX ADMIN — Medication 3 MILLILITER(S): at 16:00

## 2018-09-02 RX ADMIN — Medication 3 MILLILITER(S): at 08:19

## 2018-09-02 RX ADMIN — HYDROMORPHONE HYDROCHLORIDE 1 MILLIGRAM(S): 2 INJECTION INTRAMUSCULAR; INTRAVENOUS; SUBCUTANEOUS at 12:58

## 2018-09-02 RX ADMIN — SIMVASTATIN 40 MILLIGRAM(S): 20 TABLET, FILM COATED ORAL at 22:20

## 2018-09-02 RX ADMIN — Medication 30 MILLIGRAM(S): at 06:24

## 2018-09-02 RX ADMIN — Medication 20 MILLIGRAM(S): at 06:26

## 2018-09-02 RX ADMIN — FENTANYL CITRATE 1 PATCH: 50 INJECTION INTRAVENOUS at 20:56

## 2018-09-02 RX ADMIN — Medication 0.5 MILLIGRAM(S): at 13:41

## 2018-09-02 RX ADMIN — HYDROMORPHONE HYDROCHLORIDE 1 MILLIGRAM(S): 2 INJECTION INTRAMUSCULAR; INTRAVENOUS; SUBCUTANEOUS at 13:13

## 2018-09-02 RX ADMIN — Medication 3 MILLILITER(S): at 03:00

## 2018-09-02 RX ADMIN — OXYCODONE HYDROCHLORIDE 10 MILLIGRAM(S): 5 TABLET ORAL at 23:15

## 2018-09-02 RX ADMIN — CEFEPIME 100 MILLIGRAM(S): 1 INJECTION, POWDER, FOR SOLUTION INTRAMUSCULAR; INTRAVENOUS at 12:58

## 2018-09-02 RX ADMIN — HYDROMORPHONE HYDROCHLORIDE 0.5 MILLIGRAM(S): 2 INJECTION INTRAMUSCULAR; INTRAVENOUS; SUBCUTANEOUS at 04:16

## 2018-09-02 RX ADMIN — Medication 40 MILLIGRAM(S): at 06:24

## 2018-09-02 RX ADMIN — Medication 250 MILLIGRAM(S): at 17:29

## 2018-09-02 RX ADMIN — Medication 0.5 MILLIGRAM(S): at 14:32

## 2018-09-02 RX ADMIN — OXYCODONE HYDROCHLORIDE 5 MILLIGRAM(S): 5 TABLET ORAL at 12:00

## 2018-09-02 RX ADMIN — OXYCODONE HYDROCHLORIDE 5 MILLIGRAM(S): 5 TABLET ORAL at 01:49

## 2018-09-02 RX ADMIN — CEFEPIME 100 MILLIGRAM(S): 1 INJECTION, POWDER, FOR SOLUTION INTRAMUSCULAR; INTRAVENOUS at 00:13

## 2018-09-02 RX ADMIN — Medication 81 MILLIGRAM(S): at 13:29

## 2018-09-02 RX ADMIN — ENOXAPARIN SODIUM 40 MILLIGRAM(S): 100 INJECTION SUBCUTANEOUS at 06:24

## 2018-09-02 RX ADMIN — OXYCODONE HYDROCHLORIDE 10 MILLIGRAM(S): 5 TABLET ORAL at 22:30

## 2018-09-02 RX ADMIN — PANTOPRAZOLE SODIUM 40 MILLIGRAM(S): 20 TABLET, DELAYED RELEASE ORAL at 06:24

## 2018-09-02 RX ADMIN — HYDROMORPHONE HYDROCHLORIDE 0.5 MILLIGRAM(S): 2 INJECTION INTRAMUSCULAR; INTRAVENOUS; SUBCUTANEOUS at 04:41

## 2018-09-02 RX ADMIN — OXYCODONE HYDROCHLORIDE 5 MILLIGRAM(S): 5 TABLET ORAL at 11:00

## 2018-09-02 RX ADMIN — Medication 3 MILLILITER(S): at 19:42

## 2018-09-02 RX ADMIN — Medication 20 MILLIGRAM(S): at 17:29

## 2018-09-02 RX ADMIN — Medication 3 MILLILITER(S): at 23:34

## 2018-09-02 NOTE — PROGRESS NOTE ADULT - PROVIDER SPECIALTY LIST ADULT
Hospitalist
Palliative Care
Palliative Care
Heme/Onc
Hospitalist
Hospitalist

## 2018-09-02 NOTE — PROGRESS NOTE ADULT - ASSESSMENT
Patient is a 85 year old female with PMH of COPD, Lung CA (s/p radiation therapy), Anxiety, and HTN who presented to the ED with worsening shortness of breath since last night due to COPD exacerbation and progression of underlying lung tumor. Patient is now DNR/DNI with comfort measures only; awaiting bed at hospice [facility].    > Acute hypoxic respiratory failure   -secondary to COPD exacerbation with likely underlying PNA and progression of Pancoast Tumor  -CT chest with progression of lung tumor and invasion of the left chest wall  -continue supplemental O2 and bronchodilators  -taper IV steroids to po   -dc abxs in am after 7 day course   -nocturnal and PRN bipap    >Metastatic NSCLC  -s/p palliative RT  -patient with severe left sided chest pain due to invasion of tumor into the chest wall  -Recent PET CT showed widely metastatic disease with possible involvement of central spinal canal at T6.   -continue analgesia as prescribed  -palliative care and hospice consults appreciated  -patient and daughter met with Dr. Mayberry on 8/29 and were informed there would be limited benefit of immunotherapy and they are agreeable to hospice services  -will adjust pain meds for adequate pain control     >Anxiety  -will increase Xanax dose .     >HTN  -continue home medications    >Dysphagia  -SLP consulted  -continue pureed diet   -ensure TID    DVT prophylaxis - heparin SC    spoke to son at bedside and daughter over the phone , updated with plan of care, need for med adjustment for better pain and anxiety control, dc to hospice once bed available

## 2018-09-02 NOTE — PROGRESS NOTE ADULT - SUBJECTIVE AND OBJECTIVE BOX
CHIEF COMPLAINT/INTERVAL HISTORY:    Patient is a 85y old  Female who presents with a chief complaint of SOB (27 Aug 2018 19:54)    SUBJECTIVE & OBJECTIVE: Pt seen and examined at bedside. No overnight events. Patient in mild - mod discomfort . c/o break through pain and anxiety. son and at bedside earlier today.  Denies nausea/vomiting, abdominal pain, diarrhea, dizziness, fever or chills.    Vital Signs Last 24 Hrs  T(C): 36.7 (02 Sep 2018 05:14), Max: 36.7 (01 Sep 2018 21:05)  T(F): 98 (02 Sep 2018 05:14), Max: 98.1 (01 Sep 2018 21:05)  HR: 102 (02 Sep 2018 19:42) (93 - 106)  BP: 149/78 (02 Sep 2018 17:28) (121/65 - 149/78)  BP(mean): --  RR: 22 (02 Sep 2018 17:28) (18 - 22)  SpO2: 93% (02 Sep 2018 19:42) (93% - 98%)    PHYSICAL EXAM:  GENERAL: elderly, frail female, laying in bed , comfortable   HEAD:  Atraumatic, Normocephalic  EYES: EOMI, PERRLA, conjunctiva and sclera clear  ENMT: Moist mucous membranes  NECK: Supple, No JVD  NERVOUS SYSTEM:  Alert & Oriented X3   CHEST/LUNG: + wheezing   HEART: Regular rate and rhythm; + S1/S2  ABDOMEN: Soft, Nontender, Nondistended   EXTREMITIES:  no pedal edema      meds reviewed

## 2018-09-02 NOTE — DIETITIAN INITIAL EVALUATION ADULT. - OTHER INFO
Pt is a little confused. Spoke at length son and daughter. They want to transfer her to hospice facility at this point.

## 2018-09-03 ENCOUNTER — TRANSCRIPTION ENCOUNTER (OUTPATIENT)
Age: 83
End: 2018-09-03

## 2018-09-03 VITALS
TEMPERATURE: 98 F | RESPIRATION RATE: 21 BRPM | OXYGEN SATURATION: 96 % | HEART RATE: 98 BPM | DIASTOLIC BLOOD PRESSURE: 63 MMHG | SYSTOLIC BLOOD PRESSURE: 118 MMHG

## 2018-09-03 PROCEDURE — 83930 ASSAY OF BLOOD OSMOLALITY: CPT

## 2018-09-03 PROCEDURE — 84145 PROCALCITONIN (PCT): CPT

## 2018-09-03 PROCEDURE — 94660 CPAP INITIATION&MGMT: CPT

## 2018-09-03 PROCEDURE — 94640 AIRWAY INHALATION TREATMENT: CPT

## 2018-09-03 PROCEDURE — 83935 ASSAY OF URINE OSMOLALITY: CPT

## 2018-09-03 PROCEDURE — 96374 THER/PROPH/DIAG INJ IV PUSH: CPT

## 2018-09-03 PROCEDURE — 85027 COMPLETE CBC AUTOMATED: CPT

## 2018-09-03 PROCEDURE — 85730 THROMBOPLASTIN TIME PARTIAL: CPT

## 2018-09-03 PROCEDURE — 99285 EMERGENCY DEPT VISIT HI MDM: CPT | Mod: 25

## 2018-09-03 PROCEDURE — 71045 X-RAY EXAM CHEST 1 VIEW: CPT

## 2018-09-03 PROCEDURE — 85610 PROTHROMBIN TIME: CPT

## 2018-09-03 PROCEDURE — 94760 N-INVAS EAR/PLS OXIMETRY 1: CPT

## 2018-09-03 PROCEDURE — 92610 EVALUATE SWALLOWING FUNCTION: CPT

## 2018-09-03 PROCEDURE — 83735 ASSAY OF MAGNESIUM: CPT

## 2018-09-03 PROCEDURE — 96375 TX/PRO/DX INJ NEW DRUG ADDON: CPT

## 2018-09-03 PROCEDURE — 80053 COMPREHEN METABOLIC PANEL: CPT

## 2018-09-03 PROCEDURE — 84300 ASSAY OF URINE SODIUM: CPT

## 2018-09-03 PROCEDURE — 93005 ELECTROCARDIOGRAM TRACING: CPT

## 2018-09-03 PROCEDURE — 81001 URINALYSIS AUTO W/SCOPE: CPT

## 2018-09-03 PROCEDURE — 80048 BASIC METABOLIC PNL TOTAL CA: CPT

## 2018-09-03 PROCEDURE — 99239 HOSP IP/OBS DSCHRG MGMT >30: CPT

## 2018-09-03 PROCEDURE — 36415 COLL VENOUS BLD VENIPUNCTURE: CPT

## 2018-09-03 PROCEDURE — 83880 ASSAY OF NATRIURETIC PEPTIDE: CPT

## 2018-09-03 PROCEDURE — 80202 ASSAY OF VANCOMYCIN: CPT

## 2018-09-03 PROCEDURE — 84443 ASSAY THYROID STIM HORMONE: CPT

## 2018-09-03 PROCEDURE — 71275 CT ANGIOGRAPHY CHEST: CPT

## 2018-09-03 PROCEDURE — 84550 ASSAY OF BLOOD/URIC ACID: CPT

## 2018-09-03 PROCEDURE — 84100 ASSAY OF PHOSPHORUS: CPT

## 2018-09-03 PROCEDURE — 84484 ASSAY OF TROPONIN QUANT: CPT

## 2018-09-03 RX ORDER — ALPRAZOLAM 0.25 MG
1 TABLET ORAL
Qty: 0 | Refills: 0 | COMMUNITY

## 2018-09-03 RX ORDER — ALPRAZOLAM 0.25 MG
1 TABLET ORAL
Qty: 9 | Refills: 0 | OUTPATIENT
Start: 2018-09-03 | End: 2018-09-05

## 2018-09-03 RX ORDER — OXYCODONE HYDROCHLORIDE 5 MG/1
1 TABLET ORAL
Qty: 12 | Refills: 0 | OUTPATIENT
Start: 2018-09-03 | End: 2018-09-04

## 2018-09-03 RX ORDER — SIMVASTATIN 20 MG/1
1 TABLET, FILM COATED ORAL
Qty: 0 | Refills: 0 | COMMUNITY

## 2018-09-03 RX ORDER — ASPIRIN/CALCIUM CARB/MAGNESIUM 324 MG
1 TABLET ORAL
Qty: 0 | Refills: 0 | COMMUNITY

## 2018-09-03 RX ORDER — FENTANYL CITRATE 50 UG/ML
12 INJECTION INTRAVENOUS
Qty: 2 | Refills: 0 | OUTPATIENT
Start: 2018-09-03 | End: 2018-09-04

## 2018-09-03 RX ORDER — ASPIRIN/CALCIUM CARB/MAGNESIUM 324 MG
1 TABLET ORAL
Qty: 0 | Refills: 0 | COMMUNITY
Start: 2018-09-03

## 2018-09-03 RX ADMIN — HYDROMORPHONE HYDROCHLORIDE 1 MILLIGRAM(S): 2 INJECTION INTRAMUSCULAR; INTRAVENOUS; SUBCUTANEOUS at 07:49

## 2018-09-03 RX ADMIN — Medication 3 MILLILITER(S): at 08:09

## 2018-09-03 RX ADMIN — Medication 40 MILLIGRAM(S): at 05:30

## 2018-09-03 RX ADMIN — PANTOPRAZOLE SODIUM 40 MILLIGRAM(S): 20 TABLET, DELAYED RELEASE ORAL at 05:31

## 2018-09-03 RX ADMIN — ENOXAPARIN SODIUM 40 MILLIGRAM(S): 100 INJECTION SUBCUTANEOUS at 05:35

## 2018-09-03 RX ADMIN — Medication 250 MILLIGRAM(S): at 05:30

## 2018-09-03 RX ADMIN — Medication 0.5 MILLIGRAM(S): at 05:30

## 2018-09-03 RX ADMIN — Medication 30 MILLIGRAM(S): at 05:31

## 2018-09-03 RX ADMIN — Medication 20 MILLIGRAM(S): at 05:30

## 2018-09-03 RX ADMIN — HYDROMORPHONE HYDROCHLORIDE 1 MILLIGRAM(S): 2 INJECTION INTRAMUSCULAR; INTRAVENOUS; SUBCUTANEOUS at 08:13

## 2018-09-03 RX ADMIN — Medication 3 MILLILITER(S): at 03:29

## 2018-09-03 NOTE — DISCHARGE NOTE ADULT - CARE PLAN
Principal Discharge DX:	Metastatic lung cancer (metastasis from lung to other site), left  Goal:	comfort care  Assessment and plan of treatment:	comfort care  Secondary Diagnosis:	Chronic obstructive pulmonary disease, unspecified COPD type  Goal:	comfort care  Assessment and plan of treatment:	comfort care  Secondary Diagnosis:	Chest wall pain  Goal:	comfort care  Assessment and plan of treatment:	comfort care  Secondary Diagnosis:	Anxiety and depression  Assessment and plan of treatment:	continue with xanax and prozac

## 2018-09-03 NOTE — DISCHARGE NOTE ADULT - MEDICATION SUMMARY - MEDICATIONS TO TAKE
I will START or STAY ON the medications listed below when I get home from the hospital:    predniSONE 20 mg oral tablet  -- 2 tab(s) by mouth once a day  -- Indication: For Shortness of breath    oxyCODONE 10 mg oral tablet  -- 1 tab(s) by mouth every 4 hours, As needed, Severe Pain (7 - 10) MDD:patient care and comfort  -- Indication: For Metastatic lung cancer (metastasis from lung to other site), left    aspirin 81 mg oral delayed release tablet  -- 1 tab(s) by mouth once a day  -- Indication: For heart    Duragesic-12 transdermal film, extended release  -- Apply on skin to affected area every 48 hours MDD:patient care and comfort  -- Caution federal law prohibits the transfer of this drug to any person other  than the person for whom it was prescribed.  Do not use unless you have been treated with another narcotic pain medicine and you are tolerant to it.  For external use only.  It is very important that you take or use this exactly as directed.  Do not skip doses or discontinue unless directed by your doctor.  May cause drowsiness.  Alcohol may intensify this effect.  Use care when operating dangerous machinery.  Remove old patch prior to applying a new patch.  This prescription cannot be refilled.  Using more of this medication than prescribed may cause serious breathing problems.    -- Indication: For Metastatic lung cancer (metastasis from lung to other site), left    Oxaydo 5 mg oral tablet  -- 1 tab(s) by mouth every 4 hours, As Needed -for moderate pain MDD:patient care and comfort  -- Caution federal law prohibits the transfer of this drug to any person other  than the person for whom it was prescribed.  It is very important that you take or use this exactly as directed.  Do not skip doses or discontinue unless directed by your doctor.  May cause drowsiness.  Alcohol may intensify this effect.  Use care when operating dangerous machinery.  This prescription cannot be refilled.  Using more of this medication than prescribed may cause serious breathing problems.    -- Indication: For Metastatic lung cancer (metastasis from lung to other site), left    PROzac 20 mg oral capsule  -- orally 2 times a day  -- Indication: For Anxiety and depression    Antivert 12.5 mg oral tablet  -- 1 tab(s) by mouth 3 times a day, As Needed  -- Indication: For vertigo    ALPRAZolam 0.5 mg oral tablet  -- 1 tab(s) by mouth 3 times a day MDD:patient care and comfort  -- Indication: For Anxiety    albuterol  -- Indication: For Shortness of breath    Procardia  -- 30 milligram(s) by mouth once a day  -- Indication: For heart    PriLOSEC 20 mg oral delayed release capsule  -- 1 cap(s) by mouth once a day  -- Indication: For reflux

## 2018-09-03 NOTE — DISCHARGE NOTE ADULT - PATIENT PORTAL LINK FT
You can access the [facility] Patient Portal, offered by [facility], by registering with the following website: [email or web address]

## 2018-09-03 NOTE — DISCHARGE NOTE ADULT - MEDICATION SUMMARY - MEDICATIONS TO STOP TAKING
I will STOP taking the medications listed below when I get home from the hospital:    Zocor 40 mg oral tablet  -- 1 tab(s) by mouth once a day (at bedtime)    oxycodone-acetaminophen 7.5 mg-300 mg oral tablet  -- 1 tab(s) by mouth every 6 hours, As Needed

## 2018-09-03 NOTE — DISCHARGE NOTE ADULT - HOSPITAL COURSE
Vital Signs Last 24 Hrs  T(C): 36.7 (03 Sep 2018 05:11), Max: 36.9 (02 Sep 2018 20:55)  T(F): 98.1 (03 Sep 2018 05:11), Max: 98.5 (02 Sep 2018 20:55)  HR: 98 (03 Sep 2018 08:30) (60 - 112)  BP: 122/68 (03 Sep 2018 08:30) (122/68 - 149/78)  BP(mean): --  RR: 20 (03 Sep 2018 09:40) (20 - 26)  SpO2: 98% (03 Sep 2018 09:40) (92% - 98%) Patient is a 85 year old female with PMH of COPD, Lung CA (s/p radiation therapy), Anxiety, and HTN who presented to the ED with worsening shortness of breath since last night due to COPD exacerbation and progression of underlying lung tumor. Patient is now DNR/DNI with comfort measures only; awaiting bed at [facility].    > Acute hypoxic respiratory failure   -secondary to COPD exacerbation with likely underlying PNA and progression of Pancoast Tumor  -CT chest with progression of lung tumor and invasion of the left chest wall  -continue supplemental O2 and bronchodilators  -taper steroids to dc   -completed antibiotics after 7 day course   -nocturnal and PRN bipap    >Metastatic NSCLC  -patient with severe left sided chest pain due to invasion of tumor into the chest wall  -Recent PET CT showed widely metastatic disease with possible involvement of central spinal canal at T6.   -continue analgesia as prescribed  -patient and daughter met with Dr. Mayberry on 8/29 and were informed there would be limited benefit of immunotherapy and they are agreeable to hospice services  >Anxiety  -c/w increase Xanax dose .     spoke daughter over the phone , updated with plan of care, and plan to dc to hospice today as bed available. daughter agrees      Vital Signs Last 24 Hrs  T(C): 36.7 (03 Sep 2018 05:11), Max: 36.9 (02 Sep 2018 20:55)  T(F): 98.1 (03 Sep 2018 05:11), Max: 98.5 (02 Sep 2018 20:55)  HR: 98 (03 Sep 2018 08:30) (60 - 112)  BP: 122/68 (03 Sep 2018 08:30) (122/68 - 149/78)  BP(mean): --  RR: 20 (03 Sep 2018 09:40) (20 - 26)  SpO2: 98% (03 Sep 2018 09:40) (92% - 98%)    PHYSICAL EXAM:  GENERAL: elderly, frail female, laying in bed , comfortable   HEAD:  Atraumatic, Normocephalic  EYES: EOMI, PERRLA, conjunctiva and sclera clear  ENMT: Moist mucous membranes  NECK: Supple, No JVD  NERVOUS SYSTEM:  Alert & Oriented X3   CHEST/LUNG: + wheezing   HEART: Regular rate and rhythm; + S1/S2  ABDOMEN: Soft, Nontender, Nondistended   EXTREMITIES:  no pedal edema    time spent for discharge 43 minutes

## 2018-09-07 ENCOUNTER — APPOINTMENT (OUTPATIENT)
Dept: HEMATOLOGY ONCOLOGY | Facility: CLINIC | Age: 83
End: 2018-09-07

## 2018-09-28 ENCOUNTER — APPOINTMENT (OUTPATIENT)
Dept: RADIATION ONCOLOGY | Facility: CLINIC | Age: 83
End: 2018-09-28

## 2020-05-01 NOTE — ED ADULT NURSE NOTE - NSFALLRSKHARMRISK_ED_ALL_ED
Bilateral inguinal hernia    H/O total hysterectomy    History of appendectomy    S/P appendectomy  1979  S/P hysterectomy yes

## 2020-09-04 NOTE — PATIENT PROFILE ADULT. - NSTOBACCO TYPE_GEN_A_CORE_RD
PT TOOK HER Rimegepant Sulfate (rimegepant) 75 MG tablet dispersible THIS MORNING AFTER WAKING UP WITH A MIGRAINE SHE STATES TOOK THIS MEDICATION AROUND 7 AM TODAY, SHE STATES SHE IS NAUSEOUS CURRENTLY AS WELL, SHE CALLED IN WONDERING IF SHE CAN TAKE OTC MEDICATION WITH THIS MEDICATION BECAUSE SHE CURRENTLY STILL HAS A SLIGHT HEADACHE.     SHE IS CURRENTLY VOMITING WHILE SPEAKING TO ME ON THE PHONE     I WAS SENDING THIS MESSAGE AND DECIDED TO WARM TRANSFER THE CALL TO ISABELLA JACOBSON.        Cigarettes

## 2020-11-02 NOTE — PATIENT PROFILE ADULT. - TOBACCO USE
Former smoker
Detail Level: Zone
General Sunscreen Counseling: I recommended a broad spectrum sunscreen with a SPF of 30 or higher.  I explained that SPF 30 sunscreens block approximately 97 percent of the sun's harmful rays.  Sunscreens should be applied at least 15 minutes prior to expected sun exposure and then every 2 hours after that as long as sun exposure continues. If swimming or exercising sunscreen should be reapplied every 45 minutes to an hour after getting wet or sweating.  One ounce, or the equivalent of a shot glass full of sunscreen, is adequate to protect the skin not covered by a bathing suit. I also recommended a lip balm with a sunscreen as well. Sun protective clothing can be used in lieu of sunscreen but must be worn the entire time you are exposed to the sun's rays.
Products Recommended: Nic bees lip moisturizer

## 2021-02-12 NOTE — DISCHARGE NOTE ADULT - NSTOBACCONEVERSMOKERY/N_GEN_A
"Chief Complaint   Patient presents with   • Sent by MD     sent by Dr. Cox. had CT done today.    • Post-op Problem     result read, \"Extraluminal air and free air are noted throughout the upper abdomen. Extraluminal air could be related to recent surgery although perforated viscus is also a possibility. Small amount of free fluid is noted in the pelvis.\"      Pt to triage for above. NAD noted. HR noted, other VSS. Denies fevers.    Denies covid s/sx, denies travel or contact with it.     /85   Pulse (!) 126   Temp 36.4 °C (97.5 °F) (Temporal)   Resp 16   Ht 1.651 m (5' 5\")   Wt 108 kg (238 lb 1.6 oz)   LMP  (LMP Unknown)   SpO2 97%   BMI 39.62 kg/m²     "
Yes

## 2022-11-14 NOTE — ASU DISCHARGE PLAN (ADULT/PEDIATRIC). - PATIENT BELONGING
Addended by: Fan Dhillon on: 11/14/2022 01:26 PM     Modules accepted: Orders patient's belongings returned

## 2023-01-01 NOTE — H&P ADULT - NSHPSOCIALHISTORY_GEN_ALL_CORE
Family History - patient denies family history of HTN, DM and CAD  Former tobacco user (smoked for 40 years)
Statement Selected

## 2023-09-15 NOTE — ED ADULT NURSE NOTE - NSFALLRSKINDICATORS_ED_ALL_ED
MRI PENDING    Completion of MRI Screening Sheet    Fax to [phone] when completed    Please call [phone]  When this is done    Thank You 4 no